# Patient Record
Sex: FEMALE | Race: BLACK OR AFRICAN AMERICAN | NOT HISPANIC OR LATINO | Employment: FULL TIME | URBAN - METROPOLITAN AREA
[De-identification: names, ages, dates, MRNs, and addresses within clinical notes are randomized per-mention and may not be internally consistent; named-entity substitution may affect disease eponyms.]

---

## 2023-03-28 LAB
ERYTHROCYTE DISTRIBUTION WIDTH (RATIO) BY AUTOMATED COUNT: 12.6 % (ref 11.5–14.5)
ERYTHROCYTE MEAN CORPUSCULAR HEMOGLOBIN CONCENTRATION (G/DL) BY AUTOMATED: 31 G/DL (ref 32–36)
ERYTHROCYTE MEAN CORPUSCULAR VOLUME (FL) BY AUTOMATED COUNT: 90 FL (ref 80–100)
ERYTHROCYTES (10*6/UL) IN BLOOD BY AUTOMATED COUNT: 3.7 X10E12/L (ref 4–5.2)
FERRITIN, PREGNANCY: 20 UG/L
FOLATE, SERUM, PREGNANCY: 22.2 NG/ML
GLUCOSE, 1 HR SCREEN, PREG: 114 MG/DL
HEMATOCRIT (%) IN BLOOD BY AUTOMATED COUNT: 33.2 % (ref 36–46)
HEMOGLOBIN (G/DL) IN BLOOD: 10.3 G/DL (ref 12–16)
IRON (UG/DL) IN SER/PLAS IN PREGNANCY: 52 UG/DL
IRON BINDING CAPACITY (UG/DL) IN PREGNANCY: 426 UG/DL
IRON SATURATION (%) IN PREGNANCY: 12 %
LEUKOCYTES (10*3/UL) IN BLOOD BY AUTOMATED COUNT: 11.4 X10E9/L (ref 4.4–11.3)
NRBC (PER 100 WBCS) BY AUTOMATED COUNT: 0 /100 WBC (ref 0–0)
PLATELETS (10*3/UL) IN BLOOD AUTOMATED COUNT: 304 X10E9/L (ref 150–450)
REFLEX ADDED, ANEMIA PANEL: ABNORMAL
SYPHILIS TOTAL AB: NONREACTIVE
VITAMIN B12, PREGNANCY: 511 PG/ML

## 2023-05-02 ENCOUNTER — APPOINTMENT (OUTPATIENT)
Dept: LAB | Facility: LAB | Age: 38
End: 2023-05-02

## 2023-05-02 LAB
ERYTHROCYTE DISTRIBUTION WIDTH (RATIO) BY AUTOMATED COUNT: 13 % (ref 11.5–14.5)
ERYTHROCYTE MEAN CORPUSCULAR HEMOGLOBIN CONCENTRATION (G/DL) BY AUTOMATED: 30.7 G/DL (ref 32–36)
ERYTHROCYTE MEAN CORPUSCULAR VOLUME (FL) BY AUTOMATED COUNT: 90 FL (ref 80–100)
ERYTHROCYTES (10*6/UL) IN BLOOD BY AUTOMATED COUNT: 3.85 X10E12/L (ref 4–5.2)
FERRITIN, PREGNANCY: 24 UG/L
FOLATE, SERUM, PREGNANCY: 16.7 NG/ML
HEMATOCRIT (%) IN BLOOD BY AUTOMATED COUNT: 34.5 % (ref 36–46)
HEMOGLOBIN (G/DL) IN BLOOD: 10.6 G/DL (ref 12–16)
HEMOGLOBIN (PG) IN RETICULOCYTES: 32 PG (ref 28–38)
IMMATURE RETIC FRACTION: 19.8 % (ref 0–16)
IRON (UG/DL) IN SER/PLAS IN PREGNANCY: 78 UG/DL
IRON BINDING CAPACITY (UG/DL) IN PREGNANCY: 405 UG/DL
IRON SATURATION (%) IN PREGNANCY: 19 %
LEUKOCYTES (10*3/UL) IN BLOOD BY AUTOMATED COUNT: 13.1 X10E9/L (ref 4.4–11.3)
NRBC (PER 100 WBCS) BY AUTOMATED COUNT: 0 /100 WBC (ref 0–0)
PLATELETS (10*3/UL) IN BLOOD AUTOMATED COUNT: 286 X10E9/L (ref 150–450)
REFLEX ADDED, ANEMIA PANEL: ABNORMAL
RETICULOCYTES (10*3/UL) IN BLOOD: 0.06 X10E12/L (ref 0.02–0.08)
RETICULOCYTES/100 ERYTHROCYTES IN BLOOD BY AUTOMATED COUNT: 1.6 % (ref 0.5–2)
VITAMIN B12, PREGNANCY: 421 PG/ML

## 2023-06-14 LAB — GROUP B STREP SCREEN: NORMAL

## 2023-07-22 ENCOUNTER — HOSPITAL ENCOUNTER (OUTPATIENT)
Dept: DATA CONVERSION | Facility: HOSPITAL | Age: 38
End: 2023-07-23
Attending: ADVANCED PRACTICE MIDWIFE

## 2023-07-22 DIAGNOSIS — R30.0 DYSURIA: ICD-10-CM

## 2023-07-23 LAB
APPEARANCE, URINE: NORMAL
ASCORBIC ACID: NORMAL MG/DL
BILIRUBIN, URINE: NORMAL
BLOOD, URINE: NORMAL
COLOR, URINE: NORMAL
GLUCOSE, URINE: NORMAL
KETONES, URINE: NORMAL
LEUKOCYTE ESTERASE, URINE: NORMAL
NITRITE, URINE: NORMAL
PH, URINE: NORMAL
PROTEIN, URINE: NORMAL
SPECIFIC GRAVITY, URINE: NORMAL
UROBILINOGEN, URINE: NORMAL

## 2023-08-09 LAB
ABO GROUP (TYPE) IN BLOOD: NORMAL
ANTIBODY SCREEN: NORMAL
ERYTHROCYTE DISTRIBUTION WIDTH (RATIO) BY AUTOMATED COUNT: 14.4 % (ref 11.5–14.5)
ERYTHROCYTE MEAN CORPUSCULAR HEMOGLOBIN CONCENTRATION (G/DL) BY AUTOMATED: 30.7 G/DL (ref 32–36)
ERYTHROCYTE MEAN CORPUSCULAR VOLUME (FL) BY AUTOMATED COUNT: 90 FL (ref 80–100)
ERYTHROCYTES (10*6/UL) IN BLOOD BY AUTOMATED COUNT: 4.75 X10E12/L (ref 4–5.2)
HEMATOCRIT (%) IN BLOOD BY AUTOMATED COUNT: 42.7 % (ref 36–46)
HEMOGLOBIN (G/DL) IN BLOOD: 13.1 G/DL (ref 12–16)
LEUKOCYTES (10*3/UL) IN BLOOD BY AUTOMATED COUNT: 5.4 X10E9/L (ref 4.4–11.3)
NRBC (PER 100 WBCS) BY AUTOMATED COUNT: 0 /100 WBC (ref 0–0)
PLATELETS (10*3/UL) IN BLOOD AUTOMATED COUNT: 340 X10E9/L (ref 150–450)
REFLEX ADDED, ANEMIA PANEL: ABNORMAL
RH FACTOR: NORMAL

## 2023-08-10 LAB — URINE CULTURE: NORMAL

## 2023-10-17 ENCOUNTER — ANCILLARY PROCEDURE (OUTPATIENT)
Dept: RADIOLOGY | Facility: CLINIC | Age: 38
End: 2023-10-17
Payer: COMMERCIAL

## 2023-10-17 DIAGNOSIS — M79.645 FINGER PAIN, LEFT: ICD-10-CM

## 2023-10-17 PROBLEM — E55.9 VITAMIN D DEFICIENCY: Status: ACTIVE | Noted: 2023-10-17

## 2023-10-17 PROBLEM — N94.6 DYSMENORRHEA: Status: ACTIVE | Noted: 2023-10-17

## 2023-10-17 PROBLEM — H52.03 HYPERMETROPIA OF BOTH EYES: Status: ACTIVE | Noted: 2023-10-17

## 2023-10-17 PROBLEM — N76.0 BACTERIAL VAGINOSIS: Status: ACTIVE | Noted: 2023-10-17

## 2023-10-17 PROBLEM — L30.9 ECZEMA: Status: ACTIVE | Noted: 2023-10-17

## 2023-10-17 PROBLEM — B96.89 BACTERIAL VAGINOSIS: Status: ACTIVE | Noted: 2023-10-17

## 2023-10-17 PROCEDURE — 73140 X-RAY EXAM OF FINGER(S): CPT | Mod: LEFT SIDE | Performed by: INTERNAL MEDICINE

## 2023-10-17 PROCEDURE — 73140 X-RAY EXAM OF FINGER(S): CPT | Mod: LT

## 2023-10-17 RX ORDER — ESTRADIOL 0.1 MG/G
2 CREAM VAGINAL NIGHTLY
COMMUNITY

## 2023-10-17 RX ORDER — METOCLOPRAMIDE 10 MG/1
1 TABLET ORAL EVERY 8 HOURS PRN
COMMUNITY
Start: 2023-06-23

## 2023-10-17 RX ORDER — ONDANSETRON 4 MG/1
1 TABLET, ORALLY DISINTEGRATING ORAL EVERY 6 HOURS PRN
COMMUNITY
Start: 2023-07-10 | End: 2023-11-28 | Stop reason: ALTCHOICE

## 2023-10-17 RX ORDER — DIPHENHYDRAMINE HCL 25 MG/1
1 TABLET ORAL EVERY 4 HOURS PRN
COMMUNITY
Start: 2023-02-03

## 2023-10-17 RX ORDER — METRONIDAZOLE 7.5 MG/G
1 GEL VAGINAL NIGHTLY
COMMUNITY
Start: 2022-05-03

## 2023-10-17 RX ORDER — HYDROCORTISONE BUTYRATE 1 MG/G
OINTMENT TOPICAL 2 TIMES DAILY PRN
COMMUNITY
Start: 2021-12-20

## 2023-10-17 RX ORDER — FAMOTIDINE 20 MG/1
1 TABLET, FILM COATED ORAL EVERY 12 HOURS
COMMUNITY
Start: 2023-06-20

## 2023-10-17 RX ORDER — FLUTICASONE PROPIONATE 50 MCG
1 SPRAY, SUSPENSION (ML) NASAL 2 TIMES DAILY
COMMUNITY
Start: 2016-01-28

## 2023-10-17 RX ORDER — VITAMIN E (DL,TOCOPHERYL ACET) 180 MG
CAPSULE ORAL
COMMUNITY

## 2023-10-17 RX ORDER — TRIAMCINOLONE ACETONIDE 1 MG/G
OINTMENT TOPICAL 2 TIMES DAILY PRN
COMMUNITY
Start: 2021-12-20

## 2023-10-17 NOTE — PROGRESS NOTES
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  Was at [employer] talking on cell phone.  And noticed finger pain and mass.  Can't recall hitting it or injuring it.  Did get back to working out. It is moderately tender- gets in the way of things.     Baby: Alexis doing well         Past MSK HX:  Specialty Problems    MSK PMH:  Had a baby 7/2023 - Alexis  7/2020 L shoulder impingement  [id number] , 2016, 2018- Left Severe patella maltracking and malalignment with a history of patellar subluxation and dislocation episodes (managed with brace and PT with Dr. Tre Wilkins)  10/2011: Right patellar tendonitis and PFS (managed by Dr. Tre Wilkins with HEP, refused PT)  3/2013: Left traumatic location with recommended formal PT and MRI if not improved (Managed by Dr. Tre Wilkins)       ROS  12 point ROS reviewed and is negative except for items listed   None     Social Hx:     Social:   Studying sports administration. Plays basketball recreationally.  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 wants to be an AD in  and then college.    Medications:   Current Outpatient Medications on File Prior to Visit   Medication Sig Dispense Refill    acetaminophen (Tylenol) 325 mg tablet TAKE 3 TABLETS BY MOUTH EVERY 6 HOURS AS NEEDED 180 tablet 0    ascorbic acid (CHEWABLE VITAMIN C ORAL) Take by mouth.      ascorbic acid/elderberry fruit (AIRBORNE, ELDERBERRY, ORAL) Take by mouth.      Banophen 25 mg tablet Take 1 tablet (25 mg) by mouth every 4 hours if needed.      cetirizine (ZyrTEC) 10 mg capsule Take 1 capsule (10 mg) by mouth once daily.      drospirenone, contraceptive, 4 mg (28) tablet TAKE 1 TABLET BY  MOUTH ONCE DAILY 28 tablet 2    estradiol (Estrace) 0.01 % (0.1 mg/gram) vaginal cream Insert 2 g into the vagina once daily at bedtime. FOR 2 WEEKS AND THEN 2-4 TIMES PER WEEK FOR MAINTENANCE      famotidine (Pepcid) 20 mg tablet Take 1 tablet (20 mg) by mouth every 12 hours.      fluticasone (Flonase) 50 mcg/actuation nasal spray Administer 1 spray into each nostril 2 times a day.      hydrocortisone butyrate 0.1 % ointment Apply topically 2 times a day as needed. 1 WEEK ON /1 WEEK OFF      ibuprofen 600 mg tablet TAKE 1 TABLET BY MOUTH EVERY 6 HOURS AS NEEDED FOR PAIN AS DIRECTED 60 tablet 0    metoclopramide (Reglan) 10 mg tablet Take 1 tablet (10 mg) by mouth every 8 hours if needed.      metroNIDAZOLE (Metrogel) 0.75 % (37.5mg/5 gram) vaginal gel Insert 1 Application into the vagina once daily at bedtime.      norethindrone (Micronor) 0.35 mg tablet TAKE 1 TABLET BY MOUTH ONCE DAILY 84 tablet 0    ondansetron ODT (Zofran-ODT) 4 mg disintegrating tablet Take 1 tablet (4 mg) by mouth every 6 hours if needed. OR 2 TABLETS EVERY 8 HOURS      triamcinolone (Kenalog) 0.1 % ointment Apply topically 2 times a day as needed. 1 WEEK ON/ 1 WEEK OFF      turmeric/turmeric ext/pepr ext (turmeric-turmeric ext-pepper) 500-3 mg capsule Take by mouth.       No current facility-administered medications on file prior to visit.         Allergies:    Allergies   Allergen Reactions    Other Unknown        Physical Exam:    There were no vitals taken for this visit.   General appearance: Well-appearing well-nourished  Psych: Normal mood and affect    Neuro: Normal sensation to light touch throughout the involved extremities  Vascular: No extremity edema or discoloration.  Skin: negative.  Lymphatic: no regional lymphadenopathy present.  Eyes: no conjunctival injection.    Hand exam  VI: no erythema of masss  ROM: FROM all PIP, DIP MCP joints  Firm masss radial aspect L 5th finger, mildly TTP  Strength 5/5 strength ,  interosseous, flex/ext all DIP, PIP, MCP joints        Imaging:  L 5th finger: no bony mass, soft tissue prominence distal phalanx  No images are attached to the encounter.   No images are attached to the encounter or orders placed in the encounter.       Imaging was personally interpreted and reviewed with the patient and/or family    Impression and Plan:  Alyssa Castellanos is a 38 y.o. female who is active who presented on 10/18/2023  with w L 5t finger distal phalanx mass. It is uncomfortable during ADLs    Objective: mass radial aspect L 5th finger distal phalanx ildly TTO  Imaging: focal St prominence radial aspect distal phalanx    Diagnosis: soft tissue mass L distal phalanx  Plan: consult hand           ** Please excuse any errors in grammar or translation related to this dictation. Voice recognition software was utilized to prepare this document. **

## 2023-10-18 ENCOUNTER — OFFICE VISIT (OUTPATIENT)
Dept: ORTHOPEDIC SURGERY | Facility: CLINIC | Age: 38
End: 2023-10-18
Payer: COMMERCIAL

## 2023-10-18 DIAGNOSIS — M79.645 FINGER PAIN, LEFT: Primary | ICD-10-CM

## 2023-10-18 PROCEDURE — 99213 OFFICE O/P EST LOW 20 MIN: CPT | Performed by: PEDIATRICS

## 2023-10-18 PROCEDURE — 1036F TOBACCO NON-USER: CPT | Performed by: PEDIATRICS

## 2023-10-23 ENCOUNTER — OFFICE VISIT (OUTPATIENT)
Dept: ORTHOPEDIC SURGERY | Facility: CLINIC | Age: 38
End: 2023-10-23
Payer: COMMERCIAL

## 2023-10-23 DIAGNOSIS — R22.32 MASS OF FINGER OF LEFT HAND: Primary | ICD-10-CM

## 2023-10-23 PROCEDURE — 99203 OFFICE O/P NEW LOW 30 MIN: CPT | Performed by: STUDENT IN AN ORGANIZED HEALTH CARE EDUCATION/TRAINING PROGRAM

## 2023-10-23 PROCEDURE — 1036F TOBACCO NON-USER: CPT | Performed by: STUDENT IN AN ORGANIZED HEALTH CARE EDUCATION/TRAINING PROGRAM

## 2023-10-23 ASSESSMENT — PAIN - FUNCTIONAL ASSESSMENT: PAIN_FUNCTIONAL_ASSESSMENT: 0-10

## 2023-10-23 ASSESSMENT — PAIN DESCRIPTION - DESCRIPTORS: DESCRIPTORS: ACHING

## 2023-10-23 ASSESSMENT — PAIN SCALES - GENERAL: PAINLEVEL_OUTOF10: 3

## 2023-10-24 ENCOUNTER — APPOINTMENT (OUTPATIENT)
Dept: OBSTETRICS AND GYNECOLOGY | Facility: HOSPITAL | Age: 38
End: 2023-10-24

## 2023-10-25 NOTE — PROGRESS NOTES
Hand and Upper Extremity Service  Initial evaluation / Consultation          Chief Complaint: Left small finger mass     Patient is a right-hand-dominant female presents with left small finger mass.  She does not recall any injury or trauma.  She noticed it when she was talking on the phone.  She states that it is moderately tender to palpation.  It bothers her when she strikes it.  It bothers her when she is exercising.  Denies any numbness or tingling.        Please refer to New Patient Intake Form scanned into patient's electronic record for self reported past medical history, past surgical history, medications, allergies, family history, social history and 10 point review of systems     Examination:  Constitutional: Oriented to person, place, and time.  Appears well-developed and well-nourished.  Head: Normocephalic and atraumatic.  Eyes: Pupils are equal, round, and reactive to light.  Cardiovascular: Intact distal pulses.   Pulmonary/Chest/Breast: Effort normal. No respiratory distress.  Neurological: Alert and oriented to person, place, and time.  Skin: Skin is warm and dry.  Psychiatric: normal mood and affect.  Behavior is normal.  Musculoskeletal: Left hand small finger  There is a radial sided finger mass over the distal phalanx.  This is semi-firm, nonfluctuant and not mobile.  Is tender to palpation.  Mass is approximately 4 mm x 5 mm.  There is a negative Tinel's over it.  Digital arteries are patent when Martin test to the fingers.       Personal Interpretation of Diagnostic studies: XR of left small finger  3 views of left finger PA, lateral, oblique demonstrate no fracture dislocations.         Impression: Left small finger soft tissue mass         In Office Procedures Performed: None         Medical Decision Making: We discussed   Operative versus nonoperative treatment for this.  I do not recommend trying to aspirate this as this is over the digital  neurovascular bundle and I do not want to injure the nerve or digital artery.  We discussed that she can closely preserve this at this increases in size or pain worsens then she can have this removed.  We discussed that this is most likely a benign tumor however it could be a malignant tumor but the chances are low.  There are no concerning factors at this time and this would be malignant and we can continue to observe the tumor for growth.    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  There is a small chance that this could be a nerve sheath tumor.         Medications Prescribed: None        Follow up: 3 months but she may cancel if symptoms or size of her tumor have not progressed.           Will Jesusucler,   [facility]  Department of Orthopaedic Surgery  Hand and Upper Extremity Reconstruction           Dictation performed with the use of voice recognition software.  Syntax and grammatical errors may exist.

## 2023-10-31 ENCOUNTER — APPOINTMENT (OUTPATIENT)
Dept: OBSTETRICS AND GYNECOLOGY | Facility: HOSPITAL | Age: 38
End: 2023-10-31
Payer: COMMERCIAL

## 2023-11-28 ENCOUNTER — OFFICE VISIT (OUTPATIENT)
Dept: OBSTETRICS AND GYNECOLOGY | Facility: HOSPITAL | Age: 38
End: 2023-11-28
Payer: COMMERCIAL

## 2023-11-28 VITALS
WEIGHT: 221 LBS | SYSTOLIC BLOOD PRESSURE: 105 MMHG | HEIGHT: 66 IN | DIASTOLIC BLOOD PRESSURE: 67 MMHG | BODY MASS INDEX: 35.52 KG/M2

## 2023-11-28 DIAGNOSIS — N89.8 VAGINAL IRRITATION: Primary | ICD-10-CM

## 2023-11-28 DIAGNOSIS — N76.0 ACUTE VAGINITIS: ICD-10-CM

## 2023-11-28 PROCEDURE — 87205 SMEAR GRAM STAIN: CPT | Performed by: STUDENT IN AN ORGANIZED HEALTH CARE EDUCATION/TRAINING PROGRAM

## 2023-11-28 PROCEDURE — 1036F TOBACCO NON-USER: CPT | Performed by: STUDENT IN AN ORGANIZED HEALTH CARE EDUCATION/TRAINING PROGRAM

## 2023-11-28 PROCEDURE — 87102 FUNGUS ISOLATION CULTURE: CPT | Performed by: STUDENT IN AN ORGANIZED HEALTH CARE EDUCATION/TRAINING PROGRAM

## 2023-11-28 PROCEDURE — 99214 OFFICE O/P EST MOD 30 MIN: CPT | Performed by: STUDENT IN AN ORGANIZED HEALTH CARE EDUCATION/TRAINING PROGRAM

## 2023-11-28 RX ORDER — FLUCONAZOLE 150 MG/1
150 TABLET ORAL
Qty: 3 TABLET | Refills: 0 | Status: SHIPPED | OUTPATIENT
Start: 2023-11-28 | End: 2023-12-05

## 2023-11-28 RX ORDER — CETIRIZINE HYDROCHLORIDE 5 MG/1
5 TABLET, CHEWABLE ORAL DAILY
COMMUNITY

## 2023-11-28 ASSESSMENT — PAIN SCALES - GENERAL: PAINLEVEL: 0-NO PAIN

## 2023-11-29 LAB
BACTERIAL VAGINOSIS VAG-IMP: NORMAL
CLUE CELLS VAG LPF-#/AREA: NORMAL /[LPF]
NUGENT SCORE: 4
YEAST VAG WET PREP-#/AREA: NORMAL

## 2023-12-01 LAB
FUNGUS SPEC CULT: ABNORMAL
FUNGUS SPEC FUNGUS STN: ABNORMAL

## 2023-12-04 NOTE — PROGRESS NOTES
Subjective   Patient ID: Alyssa Castellanos is a 38 y.o. female who presents for Follow-up (Vaginal laceration healing check/DEL 7/12/2023/Declined chaperone MARYSOL FARR).  Presents for follow up visit.  Vaginal pain is resolved and she feels much better but in the last week has developed vaginitis and thinks she may have a yeast infection.        Review of Systems   All other systems reviewed and are negative.      Objective   Physical Exam  Constitutional:       Appearance: Normal appearance.   HENT:      Head: Normocephalic and atraumatic.      Nose: Nose normal.      Mouth/Throat:      Mouth: Mucous membranes are moist.      Pharynx: No oropharyngeal exudate or posterior oropharyngeal erythema.   Eyes:      Extraocular Movements: Extraocular movements intact.      Conjunctiva/sclera: Conjunctivae normal.   Pulmonary:      Effort: Pulmonary effort is normal.   Genitourinary:     General: Normal vulva.      Comments: Thick white curd-like discharge present  Musculoskeletal:      Cervical back: Normal range of motion.   Skin:     Findings: No bruising, erythema, lesion or rash.   Neurological:      General: No focal deficit present.      Mental Status: She is alert.   Psychiatric:         Mood and Affect: Mood normal.         Behavior: Behavior normal.         Thought Content: Thought content normal.         Judgment: Judgment normal.         Assessment/Plan   Diagnoses and all orders for this visit:  Acute vaginitis  -     Vaginitis Gram Stain For Bacterial Vaginosis + Yeast  -     Fungal Culture/Smear  -     fluconazole (Diflucan) 150 mg tablet; Take 1 tablet (150 mg) by mouth every 3rd day for 3 doses. Repeat in 72 hours if symptoms persist.  If symptoms resolve after first dose, you may discard the second dose.    Follow up for well care or as needed    Chloe Martinez MD

## 2023-12-14 ENCOUNTER — CLINICAL SUPPORT (OUTPATIENT)
Dept: OBSTETRICS AND GYNECOLOGY | Facility: HOSPITAL | Age: 38
End: 2023-12-14
Payer: COMMERCIAL

## 2023-12-14 VITALS — DIASTOLIC BLOOD PRESSURE: 74 MMHG | BODY MASS INDEX: 35.83 KG/M2 | SYSTOLIC BLOOD PRESSURE: 114 MMHG | WEIGHT: 222 LBS

## 2023-12-14 DIAGNOSIS — N76.1 SUBACUTE VAGINITIS: ICD-10-CM

## 2023-12-14 DIAGNOSIS — N89.8 VAGINAL IRRITATION: Primary | ICD-10-CM

## 2023-12-14 DIAGNOSIS — N76.1 SUBACUTE VAGINITIS: Primary | ICD-10-CM

## 2023-12-14 PROCEDURE — 99211 OFF/OP EST MAY X REQ PHY/QHP: CPT | Performed by: ADVANCED PRACTICE MIDWIFE

## 2023-12-14 PROCEDURE — 87102 FUNGUS ISOLATION CULTURE: CPT

## 2023-12-14 ASSESSMENT — PAIN SCALES - GENERAL: PAINLEVEL: 0-NO PAIN

## 2023-12-20 LAB — YEAST SPEC QL CULT: ABNORMAL

## 2023-12-22 ENCOUNTER — OFFICE VISIT (OUTPATIENT)
Dept: ORTHOPEDIC SURGERY | Facility: CLINIC | Age: 38
End: 2023-12-22
Payer: COMMERCIAL

## 2023-12-22 DIAGNOSIS — R22.32 MASS OF FINGER OF LEFT HAND: Primary | ICD-10-CM

## 2023-12-22 PROCEDURE — 99213 OFFICE O/P EST LOW 20 MIN: CPT | Performed by: STUDENT IN AN ORGANIZED HEALTH CARE EDUCATION/TRAINING PROGRAM

## 2023-12-22 PROCEDURE — 1036F TOBACCO NON-USER: CPT | Performed by: STUDENT IN AN ORGANIZED HEALTH CARE EDUCATION/TRAINING PROGRAM

## 2023-12-22 RX ORDER — LIDOCAINE HYDROCHLORIDE 10 MG/ML
0.5 INJECTION INFILTRATION; PERINEURAL
Status: COMPLETED | OUTPATIENT
Start: 2023-12-22 | End: 2023-12-22

## 2023-12-22 RX ADMIN — LIDOCAINE HYDROCHLORIDE 0.5 ML: 10 INJECTION INFILTRATION; PERINEURAL at 23:22

## 2023-12-22 ASSESSMENT — PAIN SCALES - GENERAL: PAINLEVEL_OUTOF10: 0 - NO PAIN

## 2023-12-22 ASSESSMENT — PAIN - FUNCTIONAL ASSESSMENT: PAIN_FUNCTIONAL_ASSESSMENT: 0-10

## 2023-12-23 NOTE — PROGRESS NOTES
[facility]   Hand and Upper Extremity Service  Initial evaluation / Consultation          Chief Complaint: Left small finger mass     Patient is a right-hand-dominant female presents with left small finger mass.  She does not recall any injury or trauma.  She noticed it when she was talking on the phone.  She states that it is moderately tender to palpation.  It bothers her when she strikes it.  It bothers her when she is exercising.  Denies any numbness or tingling.    12/22/2023 follow-up  Patient presents for follow-up today.  She continues to have a left small finger mass over her distal phalanx of her small finger.  She continues to have pain with it.  She denies numbness or tingling.        Please refer to New Patient Intake Form scanned into patient's electronic record for self reported past medical history, past surgical history, medications, allergies, family history, social history and 10 point review of systems     Examination:  Constitutional: Oriented to person, place, and time.  Appears well-developed and well-nourished.  Head: Normocephalic and atraumatic.  Eyes: Pupils are equal, round, and reactive to light.  Cardiovascular: Intact distal pulses.   Pulmonary/Chest/Breast: Effort normal. No respiratory distress.  Neurological: Alert and oriented to person, place, and time.  Skin: Skin is warm and dry.  Psychiatric: normal mood and affect.  Behavior is normal.  Musculoskeletal: Left hand small finger  There is a radial sided finger mass over the distal phalanx.  This is semi-firm, nonfluctuant and not mobile.  Is tender to palpation.  Mass is approximately 6 mm x  6 mm.  There is a negative Tinel's over it.  Digital arteries are patent when Martin test to the fingers.       Personal Interpretation of Diagnostic studies: XR of left small finger  3 views of left finger PA, lateral, oblique demonstrate no fracture dislocations.         Impression: Left small finger soft  tissue mass         In Office Procedures Performed: Attempted aspiration of the left small finger mass.         Medical Decision Making: We discussed   Operative versus nonoperative treatment for this.  We discussed attempt of aspiration.  The mass is fairly superficial and easily be palpable.  The mass has grown in size since last visit.  The patient was agreeable.  The patient tolerated the procedure well however the mass was not able to be aspirated or decompressed.  We discussed obtaining an MRI or surgical excision.  The patient would like to continue to observe at this time.    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  There is a small chance that this could be a nerve sheath tumor.         Medications Prescribed: None        Follow up: 3 months but she may cancel if symptoms or size of her tumor have not progressed.    Hand / UE Inj/Asp for (Soft tissue mass over the volar radial side of the distal phalanx of the small finger.) on 12/22/2023 11:22 PM  Indications: diagnostic and pain  Details: 22 G needle, volar approach  Medications: 0.5 mL lidocaine 10 mg/mL (1 %)  Aspirate: 0 mL  Outcome: tolerated well, no immediate complications    There is no decompression of the mass.  I thought this was likely a ganglion cyst even though this is not his typical location.  However the mass was not able to be decompressed  Procedure, treatment alternatives, risks and benefits explained, specific risks discussed. Immediately prior to procedure a time out was called to verify the correct patient, procedure, equipment, support staff and site/side marked as required. Patient was prepped and draped in the usual sterile fashion.                 Jose Garcia DO  [facility]  Department of Orthopaedic Surgery  Hand and Upper Extremity Reconstruction           Dictation performed with the use of voice recognition software.  Syntax and grammatical errors may exist.

## 2023-12-26 DIAGNOSIS — B37.9 CANDIDA INFECTION: Primary | ICD-10-CM

## 2023-12-26 RX ORDER — IBREXAFUNGERP 150 MG/1
300 TABLET, FILM COATED ORAL EVERY 12 HOURS
Qty: 4 TABLET | Refills: 0 | Status: SHIPPED | OUTPATIENT
Start: 2023-12-26 | End: 2023-12-27

## 2023-12-27 ENCOUNTER — TELEPHONE (OUTPATIENT)
Dept: OBSTETRICS AND GYNECOLOGY | Facility: HOSPITAL | Age: 38
End: 2023-12-27
Payer: COMMERCIAL

## 2023-12-27 NOTE — TELEPHONE ENCOUNTER
Contacted patient regarding yeast infection medication (Brexafemme)  Treatment sent in due to patient's rare azole resistant infection  Insurance initially denied medication when put in for 4 day supply, but will cover for 30 days  Contacted pharmacy and had days supply updated to 30  Medication now approved with a copay of $85  Patient agreeable to price and will proceed with having medication filled  Pharmacy aware to get prescription ready for patient   Encouraged patient to call office with any questions or concerns  Anahy Wakefield RN

## 2023-12-29 ENCOUNTER — TELEPHONE (OUTPATIENT)
Dept: OBSTETRICS AND GYNECOLOGY | Facility: HOSPITAL | Age: 38
End: 2023-12-29
Payer: COMMERCIAL

## 2023-12-29 NOTE — TELEPHONE ENCOUNTER
Called [facility] today to make sure patient's rx was coming in order today  Pharmacy states the medication is on back order  Called [facility] pharmacy, also states medication is on back order  Patient notified and called [facility] and states the same thing  Will contact provider  Patient states symptoms are controlled at this time and has been using otc medications for symptom management  Will see if provider recommends anything else at this time  Anahy Wakefield RN

## 2024-01-11 ENCOUNTER — TELEPHONE (OUTPATIENT)
Dept: OBSTETRICS AND GYNECOLOGY | Facility: HOSPITAL | Age: 39
End: 2024-01-11
Payer: COMMERCIAL

## 2024-01-11 DIAGNOSIS — B37.31 VAGINAL YEAST INFECTION: Primary | ICD-10-CM

## 2024-01-23 ENCOUNTER — TELEPHONE (OUTPATIENT)
Dept: OBSTETRICS AND GYNECOLOGY | Facility: HOSPITAL | Age: 39
End: 2024-01-23
Payer: COMMERCIAL

## 2024-01-23 DIAGNOSIS — B37.31 VAGINAL YEAST INFECTION: Primary | ICD-10-CM

## 2024-01-23 NOTE — TELEPHONE ENCOUNTER
Spoke with patient regarding medication unable to be ordered for patient's azole resistant yeast infection  Brexafemme on back order with no date mentioned of when it will be able to be ordered  Alternate treatment of 1-600mg Boric Acid suppositories to insert vaginally everyday for 2 weeks recommended by Dr. Martinez  Called patient and discussed that medication is available for purchase over the counter and to make sure whatever brand she chooses to make sure it is 600mg of Boric Acid  Stressed importance that this medication is to be used only as a vaginal suppository only and can be fatal if used orally so to be sure to keep out of reach of children  Patient verbalized understanding  Encouraged patient to call office with any questions or concerns or if treatment does not help symptoms  Anahy Wakefield RN

## 2024-07-30 ENCOUNTER — APPOINTMENT (OUTPATIENT)
Dept: PRIMARY CARE | Facility: CLINIC | Age: 39
End: 2024-07-30
Payer: COMMERCIAL

## 2024-07-30 VITALS
OXYGEN SATURATION: 99 % | TEMPERATURE: 97 F | HEART RATE: 77 BPM | RESPIRATION RATE: 16 BRPM | SYSTOLIC BLOOD PRESSURE: 98 MMHG | BODY MASS INDEX: 34.79 KG/M2 | WEIGHT: 216.49 LBS | HEIGHT: 66 IN | DIASTOLIC BLOOD PRESSURE: 70 MMHG

## 2024-07-30 DIAGNOSIS — R63.5 WEIGHT GAIN: ICD-10-CM

## 2024-07-30 PROCEDURE — 3008F BODY MASS INDEX DOCD: CPT | Performed by: INTERNAL MEDICINE

## 2024-07-30 PROCEDURE — 99213 OFFICE O/P EST LOW 20 MIN: CPT | Performed by: INTERNAL MEDICINE

## 2024-07-30 ASSESSMENT — ENCOUNTER SYMPTOMS
CONSTIPATION: 0
ABDOMINAL PAIN: 0

## 2024-07-30 NOTE — PROGRESS NOTES
"Subjective   Patient ID: Alyssa Castellanos is a 39 y.o. female who presents for Weight Loss.    HPI She delivered her first son July 12 2023, she was able to lose some weight on pastpartum, then her life style was less active, re gain weight. She re start exercise in March 3 to 5 x week, since may she started using ozempic and in 2 months she has been able to loose 14 lb. She needs help since she is paying out of pocket.   Review recent labs:  pregnancy 1 hour glucose test, prior to pregnancy hemoglobin A1c and fasting glucose : all wnl.      Review of Systems   Gastrointestinal:  Negative for abdominal pain and constipation.        Early saciety   All other systems reviewed and are negative.      Objective   BP 98/70 (BP Location: Left arm, Patient Position: Sitting, BP Cuff Size: Child)   Pulse 77   Temp 36.1 °C (97 °F)   Resp 16   Ht 1.676 m (5' 6\")   Wt 98.2 kg (216 lb 7.9 oz)   SpO2 99%   BMI 34.94 kg/m²     Physical Exam  Eyes- Conjunctiva clear  Neck-no thyromegaly  Cardiac- regular rate and rhythm, no murmurs  Lung- clear to auscultation  GI- present  bowel sounds, nontender, no rebound  MSK- no deformities  Extremities- no edema, good distal pulses  Neuro- non focal, oriented x 3  Psychiatric- pleasant, well groomed, no hallucinations    Assessment/Plan   Problem List Items Addressed This Visit             ICD-10-CM    BMI 34.0-34.9,adult - Primary Z68.34     Pt will try next month if her insurance covers tirzapide. She will return to us for physical in 2 months.         Relevant Medications    tirzepatide, weight loss, (Zepbound) 2.5 mg/0.5 mL injection    Weight gain R63.5          "

## 2024-07-30 NOTE — ASSESSMENT & PLAN NOTE
Pt will try next month if her insurance covers tirzapide. She will return to us for physical in 2 months.

## 2024-07-30 NOTE — PATIENT INSTRUCTIONS
Continue exercise, maintain low carb diet, try new medication also weekly when done with current, contact us sooner than your physical as needed.

## 2024-07-31 PROBLEM — R63.5 WEIGHT GAIN: Status: ACTIVE | Noted: 2024-07-31

## 2024-10-17 ENCOUNTER — APPOINTMENT (OUTPATIENT)
Dept: PRIMARY CARE | Facility: CLINIC | Age: 39
End: 2024-10-17
Payer: COMMERCIAL

## 2024-10-17 ENCOUNTER — LAB (OUTPATIENT)
Dept: LAB | Facility: LAB | Age: 39
End: 2024-10-17
Payer: COMMERCIAL

## 2024-10-17 VITALS
RESPIRATION RATE: 16 BRPM | DIASTOLIC BLOOD PRESSURE: 70 MMHG | OXYGEN SATURATION: 99 % | BODY MASS INDEX: 32.45 KG/M2 | HEIGHT: 66 IN | SYSTOLIC BLOOD PRESSURE: 108 MMHG | WEIGHT: 201.94 LBS | HEART RATE: 76 BPM

## 2024-10-17 DIAGNOSIS — Z00.00 PHYSICAL EXAM, ANNUAL: ICD-10-CM

## 2024-10-17 DIAGNOSIS — Z00.00 PHYSICAL EXAM, ANNUAL: Primary | ICD-10-CM

## 2024-10-17 LAB
25(OH)D3 SERPL-MCNC: 18 NG/ML (ref 30–100)
ALBUMIN SERPL BCP-MCNC: 4.5 G/DL (ref 3.4–5)
ALP SERPL-CCNC: 59 U/L (ref 33–110)
ALT SERPL W P-5'-P-CCNC: 14 U/L (ref 7–45)
ANION GAP SERPL CALC-SCNC: 13 MMOL/L (ref 10–20)
AST SERPL W P-5'-P-CCNC: 22 U/L (ref 9–39)
BILIRUB SERPL-MCNC: 0.7 MG/DL (ref 0–1.2)
BUN SERPL-MCNC: 12 MG/DL (ref 6–23)
CALCIUM SERPL-MCNC: 9.6 MG/DL (ref 8.6–10.6)
CHLORIDE SERPL-SCNC: 102 MMOL/L (ref 98–107)
CHOLEST SERPL-MCNC: 212 MG/DL (ref 0–199)
CHOLESTEROL/HDL RATIO: 4.6
CO2 SERPL-SCNC: 26 MMOL/L (ref 21–32)
CREAT SERPL-MCNC: 0.82 MG/DL (ref 0.5–1.05)
EGFRCR SERPLBLD CKD-EPI 2021: >90 ML/MIN/1.73M*2
ERYTHROCYTE [DISTWIDTH] IN BLOOD BY AUTOMATED COUNT: 13 % (ref 11.5–14.5)
EST. AVERAGE GLUCOSE BLD GHB EST-MCNC: 100 MG/DL
GLUCOSE SERPL-MCNC: 90 MG/DL (ref 74–99)
HBA1C MFR BLD: 5.1 %
HCT VFR BLD AUTO: 41.4 % (ref 36–46)
HDLC SERPL-MCNC: 46.5 MG/DL
HGB BLD-MCNC: 12.6 G/DL (ref 12–16)
LDLC SERPL CALC-MCNC: 154 MG/DL
MCH RBC QN AUTO: 27.5 PG (ref 26–34)
MCHC RBC AUTO-ENTMCNC: 30.4 G/DL (ref 32–36)
MCV RBC AUTO: 90 FL (ref 80–100)
MEV IGG SER QL IA: POSITIVE
MUMPS IGG ANTIBODY INDEX: 2.8 IA
MUV IGG SER IA-ACNC: POSITIVE
NON HDL CHOLESTEROL: 166 MG/DL (ref 0–149)
NRBC BLD-RTO: 0 /100 WBCS (ref 0–0)
PLATELET # BLD AUTO: 337 X10*3/UL (ref 150–450)
POTASSIUM SERPL-SCNC: 4 MMOL/L (ref 3.5–5.3)
PROT SERPL-MCNC: 7.3 G/DL (ref 6.4–8.2)
RBC # BLD AUTO: 4.58 X10*6/UL (ref 4–5.2)
RUBEOLA IGG ANTIBODY INDEX: >8 IA
RUBV IGG SERPL IA-ACNC: 3.1 IA
RUBV IGG SERPL QL IA: POSITIVE
SODIUM SERPL-SCNC: 137 MMOL/L (ref 136–145)
TRIGL SERPL-MCNC: 59 MG/DL (ref 0–149)
TSH SERPL-ACNC: 1.75 MIU/L (ref 0.44–3.98)
VLDL: 12 MG/DL (ref 0–40)
WBC # BLD AUTO: 6.5 X10*3/UL (ref 4.4–11.3)

## 2024-10-17 PROCEDURE — 82306 VITAMIN D 25 HYDROXY: CPT

## 2024-10-17 PROCEDURE — 86765 RUBEOLA ANTIBODY: CPT

## 2024-10-17 PROCEDURE — 80053 COMPREHEN METABOLIC PANEL: CPT

## 2024-10-17 PROCEDURE — 3008F BODY MASS INDEX DOCD: CPT | Performed by: INTERNAL MEDICINE

## 2024-10-17 PROCEDURE — 80061 LIPID PANEL: CPT

## 2024-10-17 PROCEDURE — 83036 HEMOGLOBIN GLYCOSYLATED A1C: CPT

## 2024-10-17 PROCEDURE — 84443 ASSAY THYROID STIM HORMONE: CPT

## 2024-10-17 PROCEDURE — 85027 COMPLETE CBC AUTOMATED: CPT

## 2024-10-17 PROCEDURE — 36415 COLL VENOUS BLD VENIPUNCTURE: CPT

## 2024-10-17 PROCEDURE — 99395 PREV VISIT EST AGE 18-39: CPT | Performed by: INTERNAL MEDICINE

## 2024-10-17 PROCEDURE — 1036F TOBACCO NON-USER: CPT | Performed by: INTERNAL MEDICINE

## 2024-10-17 PROCEDURE — 86735 MUMPS ANTIBODY: CPT

## 2024-10-17 PROCEDURE — 86317 IMMUNOASSAY INFECTIOUS AGENT: CPT

## 2024-10-17 RX ORDER — EPINEPHRINE 0.3 MG/.3ML
INJECTION SUBCUTANEOUS
COMMUNITY
Start: 2024-03-26

## 2024-10-17 ASSESSMENT — ENCOUNTER SYMPTOMS
TREMORS: 0
BLOOD IN STOOL: 0
DEPRESSION: 0
AGITATION: 0
LIGHT-HEADEDNESS: 0
OCCASIONAL FEELINGS OF UNSTEADINESS: 0
NERVOUS/ANXIOUS: 0
ABDOMINAL DISTENTION: 0
BACK PAIN: 0
ABDOMINAL PAIN: 0
SLEEP DISTURBANCE: 0
PALPITATIONS: 0
LOSS OF SENSATION IN FEET: 0
CONSTIPATION: 0
NECK PAIN: 0
HEADACHES: 0
SHORTNESS OF BREATH: 0

## 2024-10-17 ASSESSMENT — PATIENT HEALTH QUESTIONNAIRE - PHQ9
2. FEELING DOWN, DEPRESSED OR HOPELESS: NOT AT ALL
SUM OF ALL RESPONSES TO PHQ9 QUESTIONS 1 AND 2: 0
1. LITTLE INTEREST OR PLEASURE IN DOING THINGS: NOT AT ALL

## 2024-10-17 ASSESSMENT — COLUMBIA-SUICIDE SEVERITY RATING SCALE - C-SSRS
6. HAVE YOU EVER DONE ANYTHING, STARTED TO DO ANYTHING, OR PREPARED TO DO ANYTHING TO END YOUR LIFE?: NO
1. IN THE PAST MONTH, HAVE YOU WISHED YOU WERE DEAD OR WISHED YOU COULD GO TO SLEEP AND NOT WAKE UP?: NO
2. HAVE YOU ACTUALLY HAD ANY THOUGHTS OF KILLING YOURSELF?: NO

## 2024-10-17 NOTE — PATIENT INSTRUCTIONS
Continue healthy life style, see soon gynecologist, today you will have blood work. Return sooner than 1 year.

## 2024-10-17 NOTE — PROGRESS NOTES
"Subjective   Patient ID: Alyssa Castellanos is a 39 y.o. female who presents for Annual Exam.    HPI She is 15 months postpartum, she stopped breastfeeding 7 months ago, she had laceration during delivery that heal well.    Her diet is currently 2 to 3 meals, or protein shakes. She  consumes poultry mostly. Average fresh produce about 2 to 3/day. Rare sweets.   Her insurance did not cover GLP1 and she found private clinic where she is paying affordable amount, since June, she was switched from ozempic to monjauro 1 m ago. Good tolerance , no constipation,no nausea and observe early saciety.  She has lost almost 30 lb.  She is currently doing 5 x week exercise cardio and weights . She is also basketball coaching daily.  Her cycles are irregular , she believes is related to med switch. She is interested in IUD.  Feels tired doing two jobs and raising her child.  Review of Systems   Respiratory:  Negative for shortness of breath.    Cardiovascular:  Negative for chest pain and palpitations.   Gastrointestinal:  Negative for abdominal distention, abdominal pain, blood in stool and constipation.   Musculoskeletal:  Negative for back pain and neck pain.   Neurological:  Negative for tremors, light-headedness and headaches.   Psychiatric/Behavioral:  Negative for agitation and sleep disturbance. The patient is not nervous/anxious.    All other systems reviewed and are negative.      Objective   /70 (BP Location: Left arm, Patient Position: Sitting, BP Cuff Size: Adult)   Pulse 76   Resp 16   Ht 1.676 m (5' 6\")   Wt 91.6 kg (201 lb 15.1 oz)   SpO2 99%   BMI 32.59 kg/m²     Physical Exam  15 lb down since july  Eyes - conjunctiva clear, PERRLA  HEENT - no impacted wax  Neck - No cervical lymphadenopathy, no thyromegaly  Axilla - no palpable lymphadenopathy  Cardiac - regular rate and rhythm, no murmurs, no carotid bruit, no JVP  Lung- clear to auscultation, no rales, no rhonchi, no wheezing  GI- normally active bowel " sounds, nontender, nondistended, no hepatosplenomegaly, no rebound  MSK - no deformities  Neuro - non focal, oriented x 3  Extremities - no edema, good distal arterial pulses  Skin - no rash  Psychiatric - pleasant, cooperative, no hallucinations    Assessment/Plan   Problem List Items Addressed This Visit             ICD-10-CM    Physical exam, annual - Primary Z00.00     Discussed diet, exercise, immunizations, and screening appropriate for their age.  Colonoscopy: never  PAP: April 2022 normal           Relevant Orders    CBC    Hemoglobin A1C    Comprehensive Metabolic Panel    Lipid Panel    TSH with reflex to Free T4 if abnormal    Vitamin D 25-Hydroxy,Total (for eval of Vitamin D levels)    Rubella Antibody, IgG    Rubeola Antibody, IgG    Mumps Antibody, IgG    Referral to Ophthalmology

## 2024-10-17 NOTE — ASSESSMENT & PLAN NOTE
Discussed diet, exercise,, and screening appropriate for their age. Declines inmunizations.  Colonoscopy: never  PAP: April 2022 normal

## 2024-10-18 ENCOUNTER — HOSPITAL ENCOUNTER (OUTPATIENT)
Dept: RADIOLOGY | Facility: CLINIC | Age: 39
Discharge: HOME | End: 2024-10-18
Payer: COMMERCIAL

## 2024-10-18 ENCOUNTER — APPOINTMENT (OUTPATIENT)
Dept: ORTHOPEDIC SURGERY | Facility: CLINIC | Age: 39
End: 2024-10-18
Payer: COMMERCIAL

## 2024-10-18 DIAGNOSIS — R22.32 MASS OF FINGER OF LEFT HAND: ICD-10-CM

## 2024-10-18 DIAGNOSIS — R22.32 MASS OF FINGER OF LEFT HAND: Primary | ICD-10-CM

## 2024-10-18 PROCEDURE — 73140 X-RAY EXAM OF FINGER(S): CPT | Mod: LT

## 2024-10-18 PROCEDURE — 99213 OFFICE O/P EST LOW 20 MIN: CPT | Performed by: STUDENT IN AN ORGANIZED HEALTH CARE EDUCATION/TRAINING PROGRAM

## 2024-10-18 ASSESSMENT — PAIN - FUNCTIONAL ASSESSMENT: PAIN_FUNCTIONAL_ASSESSMENT: NO/DENIES PAIN

## 2024-10-19 NOTE — H&P (VIEW-ONLY)
[facility]   Hand and Upper Extremity Service  Initial evaluation / Consultation          Chief Complaint: Left small finger mass     Patient is a right-hand-dominant female presents with left small finger mass.  She does not recall any injury or trauma.  She noticed it when she was talking on the phone.  She states that it is moderately tender to palpation.  It bothers her when she strikes it.  It bothers her when she is exercising.  Denies any numbness or tingling.    12/22/2023 follow-up  Patient presents for follow-up today.  She continues to have a left small finger mass over her distal phalanx of her small finger.  She continues to have pain with it.  She denies numbness or tingling.    8/18/2024 follow-up:  Patient presents for follow-up today.  She notes that her left small finger mass over her distal phalanx has increased significantly in size.  She notes that it is tender when it is bumped.  She denies numbness and tingling.  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        Please refer to New Patient Intake Form scanned into patient's electronic record for self reported past medical history, past surgical history, medications, allergies, family history, social history and 10 point review of systems     Examination:  Constitutional: Oriented to person, place, and time.  Appears well-developed and well-nourished.  Head: Normocephalic and atraumatic.  Eyes: Pupils are equal, round, and reactive to light.  Cardiovascular: Intact distal pulses.   Pulmonary/Chest/Breast: Effort normal. No respiratory distress.  Neurological: Alert and oriented to person, place, and time.  Skin: Skin is warm and dry.  Psychiatric: normal mood and affect.  Behavior is normal.  Musculoskeletal: Left hand small finger  There is a large mass over the volar surface of the distal phalanx.  This appears larger on  the radial side however consistent of nearly the entire volar surface..  This is semi-firm, nonfluctuant and not mobile.  Is tender to palpation.  Mass is over a 1 cm x 1 cm.  There is a negative Tinel's over it.  Digital arteries are patent when Martin test to the fingers.       Personal Interpretation of Diagnostic studies: Repeat XR of left small finger were obtained today in office  3 views of left finger PA, lateral, oblique demonstrate no fracture dislocations.   no erosive changes.         Impression: Left small finger soft tissue mass         In Office Procedures Performed: None      Medical Decision Making: We discussed   This mass has grown considerably since her last visit.  It is symptomatic and painful when she bumps it.  At 1 point an attempted aspiration was performed as it was much smaller however this is much larger and it is more firm and does not appear to be consistent with a ganglion cyst.  This most likely is consistent with a giant cell tumor however it could be several different types of tumors.  She denies any previous lacerations or wounds but it could be an epidermal inclusion cyst, a nerve sheath tumor, malignant tumor, etc.  At this point based on the symptoms and the continued expansion of the tumor we will perform excision of of her tumor and we will send it off to pathology.           Medications Prescribed: None        Follow up: Postop    Alyssa Castellanos  [id number]  For Surgical Planning:  Diagnosis: Left small finger soft tissue mass  Procedure: Excision left small finger soft tissue mass  CPT: 35656  Anesthesia: MAC  Duration: 60 minutes  Special Equipment Needed: none  Medical Notes / PM / DM / PAT needed?: no  Location: anywhere  Initial Post Operative Visit: 2 weeks             Will Beucler,   [facility]  Department of Orthopaedic Surgery  Hand and Upper Extremity Reconstruction           Dictation performed with the use of voice recognition  software.  Syntax and grammatical errors may exist.

## 2024-10-19 NOTE — PROGRESS NOTES
[facility]   Hand and Upper Extremity Service  Initial evaluation / Consultation          Chief Complaint: Left small finger mass     Patient is a right-hand-dominant female presents with left small finger mass.  She does not recall any injury or trauma.  She noticed it when she was talking on the phone.  She states that it is moderately tender to palpation.  It bothers her when she strikes it.  It bothers her when she is exercising.  Denies any numbness or tingling.    12/22/2023 follow-up  Patient presents for follow-up today.  She continues to have a left small finger mass over her distal phalanx of her small finger.  She continues to have pain with it.  She denies numbness or tingling.    8/18/2024 follow-up:  Patient presents for follow-up today.  She notes that her left small finger mass over her distal phalanx has increased significantly in size.  She notes that it is tender when it is bumped.  She denies numbness and tingling.  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        Please refer to New Patient Intake Form scanned into patient's electronic record for self reported past medical history, past surgical history, medications, allergies, family history, social history and 10 point review of systems     Examination:  Constitutional: Oriented to person, place, and time.  Appears well-developed and well-nourished.  Head: Normocephalic and atraumatic.  Eyes: Pupils are equal, round, and reactive to light.  Cardiovascular: Intact distal pulses.   Pulmonary/Chest/Breast: Effort normal. No respiratory distress.  Neurological: Alert and oriented to person, place, and time.  Skin: Skin is warm and dry.  Psychiatric: normal mood and affect.  Behavior is normal.  Musculoskeletal: Left hand small finger  There is a large mass over the volar surface of the distal phalanx.  This appears larger on  the radial side however consistent of nearly the entire volar surface..  This is semi-firm, nonfluctuant and not mobile.  Is tender to palpation.  Mass is over a 1 cm x 1 cm.  There is a negative Tinel's over it.  Digital arteries are patent when Martin test to the fingers.       Personal Interpretation of Diagnostic studies: Repeat XR of left small finger were obtained today in office  3 views of left finger PA, lateral, oblique demonstrate no fracture dislocations.   no erosive changes.         Impression: Left small finger soft tissue mass         In Office Procedures Performed: None      Medical Decision Making: We discussed   This mass has grown considerably since her last visit.  It is symptomatic and painful when she bumps it.  At 1 point an attempted aspiration was performed as it was much smaller however this is much larger and it is more firm and does not appear to be consistent with a ganglion cyst.  This most likely is consistent with a giant cell tumor however it could be several different types of tumors.  She denies any previous lacerations or wounds but it could be an epidermal inclusion cyst, a nerve sheath tumor, malignant tumor, etc.  At this point based on the symptoms and the continued expansion of the tumor we will perform excision of of her tumor and we will send it off to pathology.           Medications Prescribed: None        Follow up: Postop    Alyssa Castellanos  [id number]  For Surgical Planning:  Diagnosis: Left small finger soft tissue mass  Procedure: Excision left small finger soft tissue mass  CPT: 23628  Anesthesia: MAC  Duration: 60 minutes  Special Equipment Needed: none  Medical Notes / PM / DM / PAT needed?: no  Location: anywhere  Initial Post Operative Visit: 2 weeks             Will Beucler,   [facility]  Department of Orthopaedic Surgery  Hand and Upper Extremity Reconstruction           Dictation performed with the use of voice recognition  software.  Syntax and grammatical errors may exist.

## 2024-10-21 DIAGNOSIS — R22.32 MASS OF FINGER OF LEFT HAND: ICD-10-CM

## 2024-10-22 NOTE — PROGRESS NOTES
"   Alyssa Castellanos is a 39 y.o. here for contraception counseling    HPI: Her baby is one year old and she is sure she is ready for an IUD.  She stopped taking pills and uses condoms inconsistently.    Last intercourse was Oct 20 and time prior to that was Sept 29.  Last period 9/9- she has irregular periods.  She has had irregular period depending on her weight.     GynHx: ?PCOS?      Social History     Substance and Sexual Activity   Sexual Activity Yes    Partners: Male     Objective   /72 (BP Location: Right arm)   Ht 1.676 m (5' 6\")   Wt 91.7 kg (202 lb 3.2 oz)   LMP 09/09/2024   BMI 32.64 kg/m²      General:   Alert and oriented, in no acute distress   Neck:    Breast/Axilla:    Abdomen:    Vulva:    Vagina:    Cervix:    Uterus:    Adnexa:    Pelvic Floor    Psych Normal affect. Normal mood.      Assessment and Plan:  Contraception counseling  Will give reshma for EC today  Will place IUD in two weeks- consent signed, side effects of bleeding discussed, she will take ibuprofen prior to that visit and will consider if she wants a block    Problem List Items Addressed This Visit    None  Visit Diagnoses       Encounter for IUD insertion    -  Primary    Encounter for emergency contraception        Relevant Medications    ulipristal (Reshma) 30 mg tablet           No orders of the defined types were placed in this encounter.       "

## 2024-10-24 ENCOUNTER — APPOINTMENT (OUTPATIENT)
Dept: OBSTETRICS AND GYNECOLOGY | Facility: CLINIC | Age: 39
End: 2024-10-24
Payer: COMMERCIAL

## 2024-10-24 ENCOUNTER — PHARMACY VISIT (OUTPATIENT)
Dept: PHARMACY | Facility: CLINIC | Age: 39
End: 2024-10-24
Payer: MEDICARE

## 2024-10-24 VITALS
SYSTOLIC BLOOD PRESSURE: 122 MMHG | BODY MASS INDEX: 32.5 KG/M2 | DIASTOLIC BLOOD PRESSURE: 72 MMHG | WEIGHT: 202.2 LBS | HEIGHT: 66 IN

## 2024-10-24 DIAGNOSIS — Z30.430 ENCOUNTER FOR IUD INSERTION: Primary | ICD-10-CM

## 2024-10-24 DIAGNOSIS — Z30.012 ENCOUNTER FOR EMERGENCY CONTRACEPTION: ICD-10-CM

## 2024-10-24 DIAGNOSIS — Z30.09 ENCOUNTER FOR OTHER GENERAL COUNSELING OR ADVICE ON CONTRACEPTION: ICD-10-CM

## 2024-10-24 PROCEDURE — RXMED WILLOW AMBULATORY MEDICATION CHARGE

## 2024-10-24 PROCEDURE — 3008F BODY MASS INDEX DOCD: CPT | Performed by: OBSTETRICS & GYNECOLOGY

## 2024-10-24 PROCEDURE — RXOTC WILLOW AMBULATORY OTC CHARGE

## 2024-10-24 PROCEDURE — 99213 OFFICE O/P EST LOW 20 MIN: CPT | Performed by: OBSTETRICS & GYNECOLOGY

## 2024-10-24 PROCEDURE — 1036F TOBACCO NON-USER: CPT | Performed by: OBSTETRICS & GYNECOLOGY

## 2024-10-24 RX ORDER — AZELASTINE 1 MG/ML
1 SPRAY, METERED NASAL
COMMUNITY
Start: 2024-03-26

## 2024-10-24 RX ORDER — MONTELUKAST SODIUM 10 MG/1
1 TABLET ORAL
COMMUNITY
Start: 2024-10-06

## 2024-10-24 ASSESSMENT — ENCOUNTER SYMPTOMS
HEMATOLOGIC/LYMPHATIC NEGATIVE: 0
EYES NEGATIVE: 0
NEUROLOGICAL NEGATIVE: 0
RESPIRATORY NEGATIVE: 0
ALLERGIC/IMMUNOLOGIC NEGATIVE: 0
ENDOCRINE NEGATIVE: 0
CARDIOVASCULAR NEGATIVE: 0
CONSTITUTIONAL NEGATIVE: 0
PSYCHIATRIC NEGATIVE: 0
GASTROINTESTINAL NEGATIVE: 0
MUSCULOSKELETAL NEGATIVE: 0

## 2024-10-24 ASSESSMENT — PAIN SCALES - GENERAL: PAINLEVEL_OUTOF10: 0-NO PAIN

## 2024-10-29 ENCOUNTER — TELEPHONE (OUTPATIENT)
Dept: PRIMARY CARE | Facility: CLINIC | Age: 39
End: 2024-10-29
Payer: COMMERCIAL

## 2024-10-29 RX ORDER — FLUTICASONE PROPIONATE 50 MCG
1 SPRAY, SUSPENSION (ML) NASAL DAILY PRN
COMMUNITY

## 2024-11-05 ENCOUNTER — ANESTHESIA EVENT (OUTPATIENT)
Dept: OPERATING ROOM | Facility: CLINIC | Age: 39
End: 2024-11-05
Payer: COMMERCIAL

## 2024-11-05 RX ORDER — ACETAMINOPHEN 325 MG/1
650 TABLET ORAL EVERY 4 HOURS PRN
OUTPATIENT
Start: 2024-11-05

## 2024-11-05 RX ORDER — LIDOCAINE HYDROCHLORIDE 10 MG/ML
0.1 INJECTION, SOLUTION EPIDURAL; INFILTRATION; INTRACAUDAL; PERINEURAL ONCE
OUTPATIENT
Start: 2024-11-05 | End: 2024-11-05

## 2024-11-05 RX ORDER — LABETALOL HYDROCHLORIDE 5 MG/ML
5 INJECTION, SOLUTION INTRAVENOUS ONCE AS NEEDED
OUTPATIENT
Start: 2024-11-05

## 2024-11-05 RX ORDER — METOCLOPRAMIDE HYDROCHLORIDE 5 MG/ML
10 INJECTION INTRAMUSCULAR; INTRAVENOUS ONCE AS NEEDED
OUTPATIENT
Start: 2024-11-05

## 2024-11-05 RX ORDER — ONDANSETRON HYDROCHLORIDE 2 MG/ML
4 INJECTION, SOLUTION INTRAVENOUS ONCE AS NEEDED
OUTPATIENT
Start: 2024-11-05

## 2024-11-05 RX ORDER — ALBUTEROL SULFATE 0.83 MG/ML
2.5 SOLUTION RESPIRATORY (INHALATION) ONCE AS NEEDED
OUTPATIENT
Start: 2024-11-05

## 2024-11-05 RX ORDER — FENTANYL CITRATE 50 UG/ML
25 INJECTION, SOLUTION INTRAMUSCULAR; INTRAVENOUS EVERY 5 MIN PRN
OUTPATIENT
Start: 2024-11-05

## 2024-11-05 RX ORDER — FENTANYL CITRATE 50 UG/ML
50 INJECTION, SOLUTION INTRAMUSCULAR; INTRAVENOUS EVERY 5 MIN PRN
OUTPATIENT
Start: 2024-11-05

## 2024-11-07 ENCOUNTER — APPOINTMENT (OUTPATIENT)
Dept: OBSTETRICS AND GYNECOLOGY | Facility: CLINIC | Age: 39
End: 2024-11-07
Payer: COMMERCIAL

## 2024-11-07 VITALS
SYSTOLIC BLOOD PRESSURE: 120 MMHG | WEIGHT: 200 LBS | DIASTOLIC BLOOD PRESSURE: 68 MMHG | BODY MASS INDEX: 32.14 KG/M2 | HEIGHT: 66 IN

## 2024-11-07 DIAGNOSIS — Z30.430 ENCOUNTER FOR IUD INSERTION: Primary | ICD-10-CM

## 2024-11-07 ASSESSMENT — ENCOUNTER SYMPTOMS
MUSCULOSKELETAL NEGATIVE: 0
ALLERGIC/IMMUNOLOGIC NEGATIVE: 0
GASTROINTESTINAL NEGATIVE: 0
EYES NEGATIVE: 0
NEUROLOGICAL NEGATIVE: 0
RESPIRATORY NEGATIVE: 0
CARDIOVASCULAR NEGATIVE: 0
CONSTITUTIONAL NEGATIVE: 0
PSYCHIATRIC NEGATIVE: 0
ENDOCRINE NEGATIVE: 0
HEMATOLOGIC/LYMPHATIC NEGATIVE: 0

## 2024-11-07 ASSESSMENT — PAIN SCALES - GENERAL: PAINLEVEL_OUTOF10: 0-NO PAIN

## 2024-11-07 NOTE — PROGRESS NOTES
Patient ID: Alyssa Castellanos is a 39 y.o. female.  Here for a Procedure    Visit Vitals  /68 (BP Location: Right arm)        IUD Insertion    Performed by: Corinne A Bazella, MD  Authorized by: Corinne A Bazella, MD    Procedure: IUD insertion    Consent obtained by patient, parent, or legal power of  - including discussion of procedure risks and benefits, patient questions answered, and patient education provided: yes    Pregnancy risk: reasonably certain the patient is not pregnant    Date/Time of Insertion:  11/7/2024 1:53 PM  Immediately prior to procedure a time out was called: yes    Pelvic exam performed: yes    Speculum placed in vagina: yes    Cervix cleaned and prepped: yes    Tenaculum/Allis/Ring Forceps applied to cervix: yes    Uterus sound depth (cm):  8  Cervix manually dilated: no    IUD inserted without complications: yes    Strings trimmed to (cm):  3  Patient tolerated procedure well: yes    Intended removal date: 8 years

## 2024-11-08 ENCOUNTER — HOSPITAL ENCOUNTER (OUTPATIENT)
Facility: CLINIC | Age: 39
Setting detail: OUTPATIENT SURGERY
Discharge: HOME | End: 2024-11-08
Attending: STUDENT IN AN ORGANIZED HEALTH CARE EDUCATION/TRAINING PROGRAM | Admitting: STUDENT IN AN ORGANIZED HEALTH CARE EDUCATION/TRAINING PROGRAM
Payer: COMMERCIAL

## 2024-11-08 ENCOUNTER — ANESTHESIA (OUTPATIENT)
Dept: OPERATING ROOM | Facility: CLINIC | Age: 39
End: 2024-11-08
Payer: COMMERCIAL

## 2024-11-08 VITALS
TEMPERATURE: 96.8 F | DIASTOLIC BLOOD PRESSURE: 63 MMHG | RESPIRATION RATE: 16 BRPM | SYSTOLIC BLOOD PRESSURE: 95 MMHG | OXYGEN SATURATION: 100 % | WEIGHT: 196.21 LBS | BODY MASS INDEX: 31.53 KG/M2 | HEART RATE: 72 BPM | HEIGHT: 66 IN

## 2024-11-08 DIAGNOSIS — R22.32 MASS OF FINGER OF LEFT HAND: ICD-10-CM

## 2024-11-08 DIAGNOSIS — G89.18 ACUTE POST-OPERATIVE PAIN: Primary | ICD-10-CM

## 2024-11-08 LAB — PREGNANCY TEST URINE, POC: NEGATIVE

## 2024-11-08 PROCEDURE — 88304 TISSUE EXAM BY PATHOLOGIST: CPT | Performed by: STUDENT IN AN ORGANIZED HEALTH CARE EDUCATION/TRAINING PROGRAM

## 2024-11-08 PROCEDURE — 7100000010 HC PHASE TWO TIME - EACH INCREMENTAL 1 MINUTE: Performed by: STUDENT IN AN ORGANIZED HEALTH CARE EDUCATION/TRAINING PROGRAM

## 2024-11-08 PROCEDURE — 2500000005 HC RX 250 GENERAL PHARMACY W/O HCPCS: Performed by: STUDENT IN AN ORGANIZED HEALTH CARE EDUCATION/TRAINING PROGRAM

## 2024-11-08 PROCEDURE — 7100000009 HC PHASE TWO TIME - INITIAL BASE CHARGE: Performed by: STUDENT IN AN ORGANIZED HEALTH CARE EDUCATION/TRAINING PROGRAM

## 2024-11-08 PROCEDURE — 2500000004 HC RX 250 GENERAL PHARMACY W/ HCPCS (ALT 636 FOR OP/ED): Performed by: OBSTETRICS & GYNECOLOGY

## 2024-11-08 PROCEDURE — 3600000008 HC OR TIME - EACH INCREMENTAL 1 MINUTE - PROCEDURE LEVEL THREE: Performed by: STUDENT IN AN ORGANIZED HEALTH CARE EDUCATION/TRAINING PROGRAM

## 2024-11-08 PROCEDURE — 88304 TISSUE EXAM BY PATHOLOGIST: CPT | Mod: TC,SUR | Performed by: STUDENT IN AN ORGANIZED HEALTH CARE EDUCATION/TRAINING PROGRAM

## 2024-11-08 PROCEDURE — 2500000004 HC RX 250 GENERAL PHARMACY W/ HCPCS (ALT 636 FOR OP/ED): Performed by: NURSE ANESTHETIST, CERTIFIED REGISTERED

## 2024-11-08 PROCEDURE — 2500000004 HC RX 250 GENERAL PHARMACY W/ HCPCS (ALT 636 FOR OP/ED): Performed by: STUDENT IN AN ORGANIZED HEALTH CARE EDUCATION/TRAINING PROGRAM

## 2024-11-08 PROCEDURE — 3700000002 HC GENERAL ANESTHESIA TIME - EACH INCREMENTAL 1 MINUTE: Performed by: STUDENT IN AN ORGANIZED HEALTH CARE EDUCATION/TRAINING PROGRAM

## 2024-11-08 PROCEDURE — 3600000003 HC OR TIME - INITIAL BASE CHARGE - PROCEDURE LEVEL THREE: Performed by: STUDENT IN AN ORGANIZED HEALTH CARE EDUCATION/TRAINING PROGRAM

## 2024-11-08 PROCEDURE — 2720000007 HC OR 272 NO HCPCS: Performed by: STUDENT IN AN ORGANIZED HEALTH CARE EDUCATION/TRAINING PROGRAM

## 2024-11-08 PROCEDURE — 3700000001 HC GENERAL ANESTHESIA TIME - INITIAL BASE CHARGE: Performed by: STUDENT IN AN ORGANIZED HEALTH CARE EDUCATION/TRAINING PROGRAM

## 2024-11-08 PROCEDURE — 26116 EXC HAND TUM DEEP < 1.5 CM: CPT | Performed by: STUDENT IN AN ORGANIZED HEALTH CARE EDUCATION/TRAINING PROGRAM

## 2024-11-08 RX ORDER — BUPIVACAINE HYDROCHLORIDE 5 MG/ML
INJECTION, SOLUTION EPIDURAL; INTRACAUDAL AS NEEDED
Status: DISCONTINUED | OUTPATIENT
Start: 2024-11-08 | End: 2024-11-08 | Stop reason: HOSPADM

## 2024-11-08 RX ORDER — SODIUM CHLORIDE 0.9 G/100ML
IRRIGANT IRRIGATION AS NEEDED
Status: DISCONTINUED | OUTPATIENT
Start: 2024-11-08 | End: 2024-11-08 | Stop reason: HOSPADM

## 2024-11-08 RX ORDER — CEFAZOLIN 1 G/1
INJECTION, POWDER, FOR SOLUTION INTRAVENOUS AS NEEDED
Status: DISCONTINUED | OUTPATIENT
Start: 2024-11-08 | End: 2024-11-08

## 2024-11-08 RX ORDER — MIDAZOLAM HYDROCHLORIDE 1 MG/ML
INJECTION, SOLUTION INTRAMUSCULAR; INTRAVENOUS AS NEEDED
Status: DISCONTINUED | OUTPATIENT
Start: 2024-11-08 | End: 2024-11-08

## 2024-11-08 RX ORDER — LIDOCAINE HYDROCHLORIDE 10 MG/ML
INJECTION, SOLUTION INFILTRATION; PERINEURAL AS NEEDED
Status: DISCONTINUED | OUTPATIENT
Start: 2024-11-08 | End: 2024-11-08 | Stop reason: HOSPADM

## 2024-11-08 RX ORDER — HYDROCODONE BITARTRATE AND ACETAMINOPHEN 5; 325 MG/1; MG/1
1 TABLET ORAL EVERY 6 HOURS PRN
Qty: 20 TABLET | Refills: 0 | Status: SHIPPED | OUTPATIENT
Start: 2024-11-08 | End: 2024-11-13

## 2024-11-08 RX ORDER — PROPOFOL 10 MG/ML
INJECTION, EMULSION INTRAVENOUS AS NEEDED
Status: DISCONTINUED | OUTPATIENT
Start: 2024-11-08 | End: 2024-11-08

## 2024-11-08 RX ORDER — FENTANYL CITRATE 50 UG/ML
INJECTION, SOLUTION INTRAMUSCULAR; INTRAVENOUS AS NEEDED
Status: DISCONTINUED | OUTPATIENT
Start: 2024-11-08 | End: 2024-11-08

## 2024-11-08 ASSESSMENT — COLUMBIA-SUICIDE SEVERITY RATING SCALE - C-SSRS
1. IN THE PAST MONTH, HAVE YOU WISHED YOU WERE DEAD OR WISHED YOU COULD GO TO SLEEP AND NOT WAKE UP?: NO
2. HAVE YOU ACTUALLY HAD ANY THOUGHTS OF KILLING YOURSELF?: NO
6. HAVE YOU EVER DONE ANYTHING, STARTED TO DO ANYTHING, OR PREPARED TO DO ANYTHING TO END YOUR LIFE?: NO

## 2024-11-08 ASSESSMENT — PAIN SCALES - GENERAL
PAINLEVEL_OUTOF10: 0 - NO PAIN
PAIN_LEVEL: 0
PAINLEVEL_OUTOF10: 0 - NO PAIN
PAINLEVEL_OUTOF10: 0 - NO PAIN

## 2024-11-08 ASSESSMENT — PAIN - FUNCTIONAL ASSESSMENT
PAIN_FUNCTIONAL_ASSESSMENT: 0-10

## 2024-11-08 NOTE — ANESTHESIA POSTPROCEDURE EVALUATION
Patient: Alyssa Castellanos    Procedure Summary       Date: 11/08/24 Room / Location: [facility] / [facility]    Anesthesia Start: 1316 Anesthesia Stop: 1405    Procedure: Excision left small finger soft tissue mass / 60 mins (Left: Little Finger) Diagnosis:       Mass of finger of left hand      (Mass of finger of left hand [R22.32])    Surgeons: Jose Garcia DO Responsible Provider: Duarte Jackson MD    Anesthesia Type: general ASA Status: 1            Anesthesia Type: general    Vitals Value Taken Time   BP 95/53 11/08/24 1402   Temp 36 °C (96.8 °F) 11/08/24 1402   Pulse 80 11/08/24 1402   Resp 16 11/08/24 1402   SpO2 97 % 11/08/24 1402       Anesthesia Post Evaluation    Patient location during evaluation: PACU  Patient participation: complete - patient participated  Level of consciousness: awake  Pain score: 0  Pain management: adequate  Airway patency: patent  Cardiovascular status: acceptable  Respiratory status: acceptable  Hydration status: acceptable  Postoperative Nausea and Vomiting: none    No notable events documented.

## 2024-11-08 NOTE — ADDENDUM NOTE
Addended by: BAZELLA, CORINNE A on: 11/8/2024 10:27 AM     Modules accepted: Orders     At risk due to recently not drinking quite enough, comorbidities  Monitor stool output - going fairly regularly but reports recent BMs hard  Bowel regimen at facility: continue docusate BID, senna qHS, change miralax from PRN to daily, adjust as appropriate; consider bisacodyl suppository PRN if no BM in 2-3 days  Encourage mobility as tolerated, PO hydration as appropriate, high fiber diet/prune juice (in outpatient setting as appropriate)  Goal is for 1 easy BM every 1-2 days

## 2024-11-08 NOTE — DISCHARGE INSTRUCTIONS
Post-Operative Instructions   Dr. Jose Leeucler   [phone]     Dressing: You have a bandage or splint covering your operative site:    __You may remove the dressing in 5 days following surgery. Once your dressing is removed you may shower and / or wash your incision in a sink with soap and water. Do not soak your incision.   No bath tubs, hot tubs or swimming pools. After washing the wound please pat the incision dry thoroughly.   Please use mild soap. Please do not use hydrogen peroxide.   Please cover the wound with a band-aid or gauze and change it daily. Please do not apply any salves, cream, lotions or ointments to the surgical incision.   Otherwise keep incision clean and dry (no yard work, engine work, etc.)    Showering: You may shower following surgery but please adhere to above instructions regarding the dressing. If showering with bandage / splint in place please ensure that it is kept dry and covered while bathing. There are commercially available cast bags that can be used to protect your dressing while showering. If using garbage bags please make sure that there are no holes in the bag and that the bag has been sealed above the dressing. If the bandage gets wet you must contact your surgeon's office to make arrangements to be seen to have bandage changed.    Ice / Elevation: The application of ice to your surgical site after surgery will help with pain control and swelling. This can be very effective for 48-72 hours after surgery. Please be careful to avoid getting bandage wet from a leaky ice bag. Please be advised that the ice may need to be applied for longer periods of time for the cooling effect to penetrate the post-operative dressing. Elevation of the operative site above the level of the heart as much as possible for the first 48-72 hours after surgery. Use your sling if you have been provided one while standing or walking. If your fingers are not included in the dressing you are  encouraged to attempt finger range of motion as this will help with your hand swelling and ultimate recovery.     Pain Medication: If you received a regional anesthetic on the day of your surgery your arm and hand may be numb for up to 24 hours after surgery. It is important to wear your sling while the block is still effective in order to protect your arm. It is advisable to take pain medications prior to going to sleep in case the regional anesthesia medication wears off while you are sleeping. Take your pain medications as directed. Narcotic pain medications can cause lethargy, nausea and constipation. Please contact your surgeon's office and discontinue the medication if these symptoms  become problematic. Eating a regular diet, drinking fluids and walking can  help with constipation from these medications.   Avoid alcohol consumption     Additional pain control options:    ___ You are encouraged to take over the counter medications like Advil / Motrin / Ibuprofen / Aleve in addition to your prescribed pain medications after surgery     Smoking / Tobacco: Tobacco use is known to interfere with wound and fracture healing and increase post-operative pain. It can also increase your risk of poor outcomes following surgery. Please do not use tobacco or nicotine products following your surgery. This includes smokeless tobacco, or nicotine replacement products (patches, gum, etc.)     Driving: It is not advisable to operate a vehicle while using narcotic pain medications. Additionally, please be advised that you are likely to have challenges operating a car or motorcycle while you are still in your postoperative dressing and this could increase your risk of being involved in an accident and being cited for driving while physically impaired.     Warning Signs: Observe your arm / hand and incision site (if visible) for increased redness, inflammation, drainage, odor or pain that is unrelieved by rest, elevation or  medication. Please contact your surgeon's office immediately if you develop any of these issues or if you develop a fever greater than 101°.     Follow Up Appointments: Your post-operative appointment has been scheduled for     ____You do not yet have a post-operative appointment scheduled. Please contact Dr. Garcia's office at [phone] to schedule this appointment.

## 2024-11-08 NOTE — INTERVAL H&P NOTE
H&P reviewed. The patient was examined and there are no changes to the H&P.    Patient was seen and evaluated in the preoperative holding area.  She was seen with her mother present.  She continues to have a left small finger soft tissue mass.  We discussed the inherent risks of the operation.  She would like to proceed with removal of her soft tissue mass.    In addition we discussed possibly numbness and tingling to her finger as this is a fairly large soft tissue mass at her distal phalanx.  We discussed that she may have some permanent numbness and tingling however most likely this will just be temporarily numb.  We also discussed that this could be a nerve sheath tumor which would cause numbness and tingling.  We discussed the low likelihood of malignant tumor.    The indications for surgical versus nonsurgical management of the condition have been advised, including the inherent risks, benefits, prognosis of the condition.  The risks of surgery include, but are not limited to, pain, bleeding, blood clots, infection, tendon damage, nerve damage, vessel damage, and need for further surgery.  The risks also include wound healing problems, decreased long-term functionality of the wrist and hand, tendon irritation, tendon rupture, and possible need for therapy for an optimum outcome.  There is a risk of complex regional pain syndrome, incomplete recovery, incomplete relief or worsening of symptoms, and recurrence. We also discussed that medical complications are possible during and after surgery related to either anesthesia or the surgical procedure itself. Specific discussion of the risks of the proposed anesthetic plan will be discussed by the patient's anesthesia provider. All risks were reviewed in layman´s terms. The general plan for the postoperative rehabilitation course, including possible need for therapy, was reviewed at great length. The patient was given ample time to ask appropriate questions and  appeared to be satisfied with the answers provided. All questions were answered and no guarantees have been given regarding the patient's condition and treatment recommendations.

## 2024-11-08 NOTE — ANESTHESIA PREPROCEDURE EVALUATION
Patient: Alyssa Castellanos    Procedure Information       Date/Time: 11/08/24 1045    Procedure: Excision left small finger soft tissue mass / 60 mins (Left: Little Finger)    Location: [facility] OR 03 / Virtual [facility] OR    Surgeons: Will B Beucler, DO            Relevant Problems   Skin   (+) Eczema       Clinical information reviewed:   Tobacco  Allergies  Meds   Med Hx  Surg Hx  OB Status  Fam Hx  Soc   Hx        NPO Detail:  NPO/Void Status  Date of Last Liquid: 11/07/24  Time of Last Liquid: 2100  Date of Last Solid: 11/07/24  Time of Last Solid: 2100  Last Intake Type: Clear fluids; Food         Physical Exam    Airway  Mallampati: II  TM distance: >3 FB     Cardiovascular   Rhythm: regular  Rate: normal     Dental - normal exam     Pulmonary   Breath sounds clear to auscultation     Abdominal   Abdomen: soft         Anesthesia Plan    History of general anesthesia?: yes  History of complications of general anesthesia?: no    ASA 1     general     intravenous induction   Anesthetic plan and risks discussed with patient.    Plan discussed with CRNA and CAA.

## 2024-11-08 NOTE — BRIEF OP NOTE
Date: 2024  OR Location: [facility] OR    Name: Alyssa Castellanos, : 1985, Age: 39 y.o., MRN: [id number], Sex: female    Diagnosis  Pre-op Diagnosis      * Mass of finger of left hand [R22.32] Post-op Diagnosis     * Mass of finger of left hand [R22.32]     Procedures  Excision left small finger soft tissue mass / 60 mins  61741 - RI EXC MARJORIE/VASC MAL SFT TISS HAND/FNGR SUBQ <1.5CM    RI EXC MARJORIE/VAS MAL SFT TIS HAND/FNGR SUBFASC<1.5CM [73567]  Surgeons      * Will B Beucler - Primary    Resident/Fellow/Other Assistant:  Surgeons and Role:     * Santi Sanchez MD - Resident - Assisting    Staff:   Circulator: Michelle Purcell Scrub: Oksana Hollinsub Person: Duc    Anesthesia Staff: Anesthesiologist: Duarte Jackson MD  CRNA: REBEKAH Leiva-CRNA    Procedure Summary  Anesthesia: General  ASA: I  Estimated Blood Loss: 5 mL  Intra-op Medications: Administrations occurring from 1045 to 1200 on 24:  * No intraprocedure medications in log *           Anesthesia Record               Intraprocedure I/O Totals          Intake    LR bolus 20.00 mL    Total Intake 20 mL          Specimen:   ID Type Source Tests Collected by Time   1 : A. LEFT SMALL FINGER TISSUE MASS Tissue SOFT TISSUE MASS RESECTION SURGICAL PATHOLOGY EXAM Will B Beucler, DO 2024 1347                  Findings: small finger soft tissue mass     Complications:  None; patient tolerated the procedure well.     Disposition: PACU - hemodynamically stable.  Condition: stable  Specimens Collected:   ID Type Source Tests Collected by Time   1 : A. LEFT SMALL FINGER TISSUE MASS Tissue SOFT TISSUE MASS RESECTION SURGICAL PATHOLOGY EXAM Will B Beucler, DO 2024 1349     Attending Attestation:     Will B Beucler  Phone Number: [phone]

## 2024-11-20 ENCOUNTER — OFFICE VISIT (OUTPATIENT)
Dept: ORTHOPEDIC SURGERY | Facility: HOSPITAL | Age: 39
End: 2024-11-20
Payer: COMMERCIAL

## 2024-11-20 DIAGNOSIS — R22.32 MASS OF FINGER OF LEFT HAND: Primary | ICD-10-CM

## 2024-11-20 LAB
LABORATORY COMMENT REPORT: NORMAL
PATH REPORT.FINAL DX SPEC: NORMAL
PATH REPORT.GROSS SPEC: NORMAL
PATH REPORT.RELEVANT HX SPEC: NORMAL
PATH REPORT.TOTAL CANCER: NORMAL

## 2024-11-20 PROCEDURE — 99211 OFF/OP EST MAY X REQ PHY/QHP: CPT | Performed by: STUDENT IN AN ORGANIZED HEALTH CARE EDUCATION/TRAINING PROGRAM

## 2024-11-20 PROCEDURE — 1036F TOBACCO NON-USER: CPT | Performed by: STUDENT IN AN ORGANIZED HEALTH CARE EDUCATION/TRAINING PROGRAM

## 2024-11-20 ASSESSMENT — PAIN - FUNCTIONAL ASSESSMENT: PAIN_FUNCTIONAL_ASSESSMENT: 0-10

## 2024-11-20 ASSESSMENT — PAIN SCALES - GENERAL: PAINLEVEL_OUTOF10: 0 - NO PAIN

## 2024-11-20 ASSESSMENT — PAIN DESCRIPTION - DESCRIPTORS: DESCRIPTORS: NUMBNESS;TINGLING

## 2024-11-20 NOTE — PROGRESS NOTES
[facility]  Hand and Upper Extremity Service  Post Operative visit        Date of surgery: 11/8/2024  Surgery(s) performed: Excision of soft tissue mass left small finger        Subjective report: Patient underwent an excision of a soft tissue mass to her left small finger.  The intraoperative findings were consistent with a giant cell tumor however pathology has not resulted yet.  She notes some mild numbness to the radial side of her finger however she states sensation is intact.  She denies any erythema warmth or drainage.  She has been working on finger range of motion.  Overall she is doing well        Exam findings;   Right small finger.  Incision is well-healed.  Sutures are intact.  These were removed in office.  There is no erythema, warmth, drainage.  She is able to flex and extend the MP and IP joints of her small finger.  She is able to make a composite fist to the distal palmar crease.  Sensation is intact to light touch to the radial and ulnar sides of the finger.  AIN, PIN, ulnar motors intact.  Sensation intact to light touch to the radial, ulnar, median nerves.  Brisk capillary refill        Radiograph findings: None        Plan: Sutures removed in office today.  She is to continue to work on gentle range of motion.  In 1 week she may begin  Scar therapy. Scar massage and wound care techniques have been discussed.  Additional recommendations include continue working on digital range of motion.  She may progressively use the hand as tolerated.  She may follow-up as needed.        Medications Prescribed: None           Will Beucler, DO  [facility]  Department of Orthopaedic Surgery  Hand and Upper Extremity Surgery  [facility]     Dictation performed with the use of voice recognition software. Syntax and grammatical errors may exist.

## 2024-11-20 NOTE — LETTER
November 20, 2024     Patient: Alyssa Castellanos   YOB: 1985   Date of Visit: 11/20/2024       To Whom It May Concern:    Alyssa Castellanos was seen in my clinic on 11/20/2024 at 8:15 am. Please excuse Alyssa for her absence from work on this day to make the appointment.    If you have any questions or concerns, please don't hesitate to call.         Sincerely,         Will B Beucler, DO        CC: No Recipients

## 2024-11-25 NOTE — OP NOTE
Excision left small finger soft tissue mass / 60 mins (L) Operative Note     Date: 2024  OR Location: [facility] OR    Name: Alyssa Castellanos, : 1985, Age: 39 y.o., MRN: [id number], Sex: female    Diagnosis  Pre-op Diagnosis      * Mass of finger of left hand [R22.32] Post-op Diagnosis     * Mass of finger of left hand [R22.32]     Procedures  Excision left small finger soft tissue mass / 60 mins  96107 - AR EXC MARJORIE/VASC MAL SFT TISS HAND/FNGR SUBQ <1.5CM    AR EXC MARJORIE/VAS MAL SFT TIS HAND/FNGR SU[facility]<1.5CM [41309]  Surgeons      * Will B Beucler - Primary    Resident/Fellow/Other Assistant:  Surgeons and Role:     * Santi Sanchez MD - Resident - Assisting    Staff:   Circulator: Michelle Purcell Scrub: Oksana Hollinsub Person: Duc    Anesthesia Staff: Anesthesiologist: Duarte Jackson MD  CRNA: REBEKAH Leiva-CRNA    Procedure Summary  Anesthesia: General  ASA: I  Estimated Blood Loss: 2mL  Intra-op Medications: Administrations occurring from 1045 to 1200 on 24:  * No intraprocedure medications in log *           Anesthesia Record               Intraprocedure I/O Totals          Intake    LR bolus 20.00 mL    Total Intake 20 mL          Specimen:   ID Type Source Tests Collected by Time   1 : A. LEFT SMALL FINGER TISSUE MASS Tissue SOFT TISSUE MASS RESECTION SURGICAL PATHOLOGY EXAM Will B Beucler, DO 2024 1349                 Drains and/or Catheters: * None in log *    Tourniquet Times:     Total Tourniquet Time Documented:  area (Left) - 14 minutes  Total: area (Left) - 14 minutes      Implants:     Findings: Mass over the distal phalanx of the small finger consistent with giant cell tumor     Indications: Aylssa Castellanos is an 39 y.o. female who is having surgery for Mass of finger of left hand [R22.32].     The patient was seen in the preoperative area. The risks, benefits, complications, treatment options, non-operative alternatives, expected recovery and outcomes were discussed  with the patient. The possibilities of reaction to medication, pulmonary aspiration, injury to surrounding structures, bleeding, recurrent infection, the need for additional procedures, failure to diagnose a condition, and creating a complication requiring transfusion or operation were discussed with the patient. The patient concurred with the proposed plan, giving informed consent.  The site of surgery was properly noted/marked if necessary per policy. The patient has been actively warmed in preoperative area. Preoperative antibiotics have been ordered and given within 1 hours of incision. Venous thrombosis prophylaxis are not indicated.    Procedure Details: Indications for procedure:     I met and evaluated the patient in the preoperative holding area today prior to the patient receiving any sedation or other medications which may alter their mental status. I confirmed their identity via the facility's protocol. I reviewed the patient's history, physical exam, and recommendation for surgery. The indications for surgical versus nonsurgical management of the condition were discussed, including the inherent risks, benefits, prognosis of the condition.  The risks of surgery include, but are not limited to, pain, bleeding, infection, tendon damage, nerve damage, vessel damage, and need for further surgery.  The risks also include wound healing problems, decreased long-term functionality of the wrist and hand, tendon irritation, tendon rupture, and possible need for therapy for an optimum outcome.  There is a risk of complex regional pain syndrome, incomplete recovery, incomplete relief or worsening of symptoms, and recurrence.  We also discussed that medical complications are possible during and after surgery related to either anesthesia or the surgical procedure itself. Specific discussion of the risks of the proposed anesthetic plan will be discussed by the patient's anesthesia provider. All risks were reviewed in  layman´s terms. The patient was given ample time to ask appropriate questions and appeared to be satisfied with the answers provided. All questions were answered and no guarantees have been given regarding the patient's condition and treatment recommendations. The general plan for the postoperative rehabilitation course, including possible need for therapy, was reviewed at great length. Informed consent was signed by all appropriate parties. The surgical site was marked in ink by myself.        Procedure in Detail:  The patient was transported to the operating room and placed in the supine position on the operating table. All extremities and prominences were appropriately padded. Adequate anesthesia was induced. A non-sterile tourniquet was applied high on the left arm. The left arm was prepped and draped in standard sterile fashion. A time-out was conducted prior to beginning the operation to confirm the patient's identity, planned procedures, laterality of procedures, and that appropriate antibiotics had been administered within 30 minutes of incision. The left upper extremity was exsanguinated with an Esmarch bandage, and the tourniquet was inflated to 250mmHg.     Oblique incision was made over the distal phalanx of the left small finger.  Full-thickness skin flaps were elevated.  There is a mass present with a yellow-orange and brown appearance.  This was consistent with a giant cell tumor.  Wittler scissors were used to bluntly dissect around the mass.  Careful dissection was carried around the ulnar and radial neurovascular bundles.  These were protected throughout the case.  Dissection was carried down all the way down to the flexor sheath.  The mass was removed in 1 piece.  This did not appear to enter the joint.  The wound was copiously irrigated.  The tourniquet was deflated and hemostasis was obtained.  The finger was pink and well-perfused.  4-0 nylon sutures were used for skin closure.  Local  anesthetic was used for digital block.  A bulky well-padded dressing was placed.     The patient was transferred to the hospital bed and transported to the post-anesthesia care unit in good condition having tolerated the procedure well. All sponge, needle, and instrument counts were correct x2. Postoperatively, the digits were pink and well perfused and the hand compartments and soft tissues were supple. No complications were apparent.    Postoperative Plan:  The patient will be nonweightbearing on their operative site. Their dressing will be removed in 5 days  They will return to clinic in 2 weeks. X-rays are not needed on return visit.  Complications:  None; patient tolerated the procedure well.    Disposition: PACU - hemodynamically stable.  Condition: stable                 Additional Details: Mass consistent with giant cell tumor    Attending Attestation: I performed the procedure.    Jose Garcia  Phone Number: [phone]

## 2025-02-14 ENCOUNTER — APPOINTMENT (OUTPATIENT)
Dept: OPHTHALMOLOGY | Facility: CLINIC | Age: 40
End: 2025-02-14
Payer: COMMERCIAL

## 2025-02-14 DIAGNOSIS — Z00.00 PHYSICAL EXAM, ANNUAL: ICD-10-CM

## 2025-02-14 DIAGNOSIS — H10.45 CHRONIC ALLERGIC CONJUNCTIVITIS: ICD-10-CM

## 2025-02-14 DIAGNOSIS — H52.03 HYPERMETROPIA OF BOTH EYES: Primary | ICD-10-CM

## 2025-02-14 PROCEDURE — 92004 COMPRE OPH EXAM NEW PT 1/>: CPT | Performed by: STUDENT IN AN ORGANIZED HEALTH CARE EDUCATION/TRAINING PROGRAM

## 2025-02-14 RX ORDER — PREDNISONE 10 MG/1
TABLET ORAL
COMMUNITY
Start: 2025-02-12

## 2025-02-14 RX ORDER — FAMOTIDINE 40 MG/1
1 TABLET, FILM COATED ORAL
COMMUNITY
Start: 2025-01-31

## 2025-02-14 ASSESSMENT — CUP TO DISC RATIO
OD_RATIO: .2
OS_RATIO: .2

## 2025-02-14 ASSESSMENT — ENCOUNTER SYMPTOMS
RESPIRATORY NEGATIVE: 0
CONSTITUTIONAL NEGATIVE: 0
ENDOCRINE NEGATIVE: 0
GASTROINTESTINAL NEGATIVE: 0
ALLERGIC/IMMUNOLOGIC NEGATIVE: 0
HEMATOLOGIC/LYMPHATIC NEGATIVE: 0
CARDIOVASCULAR NEGATIVE: 0
EYES NEGATIVE: 0
MUSCULOSKELETAL NEGATIVE: 0
NEUROLOGICAL NEGATIVE: 0
PSYCHIATRIC NEGATIVE: 0

## 2025-02-14 ASSESSMENT — SLIT LAMP EXAM - LIDS
COMMENTS: GOOD POSITION
COMMENTS: GOOD POSITION

## 2025-02-14 ASSESSMENT — REFRACTION_MANIFEST
OD_CYLINDER: SPHERE
OD_SPHERE: +1.00
OS_AXIS: 165
OS_CYLINDER: -0.75
OS_SPHERE: +1.00

## 2025-02-14 ASSESSMENT — CONF VISUAL FIELD
OS_NORMAL: 1
OD_INFERIOR_NASAL_RESTRICTION: 0
METHOD: COUNTING FINGERS
OS_INFERIOR_TEMPORAL_RESTRICTION: 0
OS_INFERIOR_NASAL_RESTRICTION: 0
OS_SUPERIOR_NASAL_RESTRICTION: 0
OD_SUPERIOR_TEMPORAL_RESTRICTION: 0
OD_SUPERIOR_NASAL_RESTRICTION: 0
OD_NORMAL: 1
OD_INFERIOR_TEMPORAL_RESTRICTION: 0
OS_SUPERIOR_TEMPORAL_RESTRICTION: 0

## 2025-02-14 ASSESSMENT — VISUAL ACUITY
METHOD: SNELLEN - LINEAR
OS_SC: 20/20
OS_SC+: -2
OD_SC: 20/20
OD_SC+: -1

## 2025-02-14 ASSESSMENT — TONOMETRY
IOP_METHOD: GOLDMANN APPLANATION
OS_IOP_MMHG: 18
OD_IOP_MMHG: 16

## 2025-02-14 ASSESSMENT — EXTERNAL EXAM - RIGHT EYE: OD_EXAM: NORMAL

## 2025-02-14 ASSESSMENT — EXTERNAL EXAM - LEFT EYE: OS_EXAM: NORMAL

## 2025-02-15 PROBLEM — H10.45 CHRONIC ALLERGIC CONJUNCTIVITIS: Status: ACTIVE | Noted: 2025-02-15

## 2025-02-15 NOTE — PROGRESS NOTES
Assessment/Plan   Diagnoses and all orders for this visit:  Hypermetropia of both eyes  Chronic allergic conjunctivitis   -stable refractive error-patient doing well without specs BCVA 20/20 OD+OS without correction (SC)  -normal binocular exam and ocular health on DFE  -hx of seasonal allergies with some mild ocular symptoms-advised OTC combo antihistamine/mast cell drops, cold compresses, and AT's prn    RTC 1 year for annual with DFE and MRX

## 2025-05-22 ENCOUNTER — APPOINTMENT (OUTPATIENT)
Dept: RADIOLOGY | Facility: HOSPITAL | Age: 40
End: 2025-05-22
Payer: COMMERCIAL

## 2025-05-22 ENCOUNTER — HOSPITAL ENCOUNTER (EMERGENCY)
Facility: HOSPITAL | Age: 40
Discharge: HOME | End: 2025-05-23
Payer: COMMERCIAL

## 2025-05-22 DIAGNOSIS — S89.92XA INJURY OF LEFT KNEE, INITIAL ENCOUNTER: Primary | ICD-10-CM

## 2025-05-22 DIAGNOSIS — S72.415A CLOSED NONDISPLACED FRACTURE OF CONDYLE OF LEFT FEMUR, INITIAL ENCOUNTER: ICD-10-CM

## 2025-05-22 PROCEDURE — 73700 CT LOWER EXTREMITY W/O DYE: CPT | Mod: LT

## 2025-05-22 PROCEDURE — 73564 X-RAY EXAM KNEE 4 OR MORE: CPT | Mod: LT

## 2025-05-22 PROCEDURE — 99284 EMERGENCY DEPT VISIT MOD MDM: CPT | Mod: 25

## 2025-05-22 PROCEDURE — 73564 X-RAY EXAM KNEE 4 OR MORE: CPT | Mod: LEFT SIDE | Performed by: RADIOLOGY

## 2025-05-22 ASSESSMENT — PAIN SCALES - GENERAL: PAINLEVEL_OUTOF10: 5 - MODERATE PAIN

## 2025-05-22 ASSESSMENT — COLUMBIA-SUICIDE SEVERITY RATING SCALE - C-SSRS
2. HAVE YOU ACTUALLY HAD ANY THOUGHTS OF KILLING YOURSELF?: NO
6. HAVE YOU EVER DONE ANYTHING, STARTED TO DO ANYTHING, OR PREPARED TO DO ANYTHING TO END YOUR LIFE?: NO
1. IN THE PAST MONTH, HAVE YOU WISHED YOU WERE DEAD OR WISHED YOU COULD GO TO SLEEP AND NOT WAKE UP?: NO

## 2025-05-22 ASSESSMENT — PAIN DESCRIPTION - LOCATION: LOCATION: KNEE

## 2025-05-22 ASSESSMENT — PAIN - FUNCTIONAL ASSESSMENT: PAIN_FUNCTIONAL_ASSESSMENT: 0-10

## 2025-05-22 ASSESSMENT — PAIN DESCRIPTION - ORIENTATION: ORIENTATION: LEFT

## 2025-05-22 NOTE — Clinical Note
Alyssa Castellanos was seen and treated in our emergency department on 5/22/2025.  She may return to work on 05/26/2025.       If you have any questions or concerns, please don't hesitate to call.      Kalyan Giron PA-C

## 2025-05-23 VITALS
TEMPERATURE: 97.3 F | BODY MASS INDEX: 28.12 KG/M2 | RESPIRATION RATE: 17 BRPM | SYSTOLIC BLOOD PRESSURE: 113 MMHG | HEART RATE: 69 BPM | DIASTOLIC BLOOD PRESSURE: 76 MMHG | HEIGHT: 66 IN | WEIGHT: 175 LBS | OXYGEN SATURATION: 98 %

## 2025-05-23 PROCEDURE — 73700 CT LOWER EXTREMITY W/O DYE: CPT | Mod: LEFT SIDE | Performed by: RADIOLOGY

## 2025-05-23 RX ORDER — IBUPROFEN 800 MG/1
800 TABLET, FILM COATED ORAL 3 TIMES DAILY
Qty: 21 TABLET | Refills: 0 | Status: SHIPPED | OUTPATIENT
Start: 2025-05-23 | End: 2025-05-30

## 2025-05-23 RX ORDER — IBUPROFEN 800 MG/1
800 TABLET, FILM COATED ORAL 3 TIMES DAILY
Qty: 21 TABLET | Refills: 0 | Status: SHIPPED | OUTPATIENT
Start: 2025-05-23 | End: 2025-05-23

## 2025-05-23 NOTE — DISCHARGE INSTRUCTIONS
You have been seen at a [facility] facility.  It was found that you have a small chip fracture on the inner part of your knee.  Please use ibuprofen and ice to manage your pain at home.  Stay off of the leg and use crutches for ambulation.  Wear your knee immobilizer at all times.  Please follow-up with your primary care provider in the next 1 to 2 days for further evaluation and routine follow-up.  Please return to the emergency room if having any worsening symptoms.  Please follow-up with your orthopedic as scheduled.  For any problems that may arise sooner you may follow-up with the walk-in clinic.  Please follow-up at the  orthopedic injury clinic at [facility] sports [facility] [place]  [address]  [address] [place] [address]  [phone]  Open for walk-ins Monday through Friday 8:30 AM through 4 PM

## 2025-05-23 NOTE — ED PROVIDER NOTES
Chief Complaint   Patient presents with    Knee Injury     Per patient injured left knee playing basketball      HPI:   Alyssa Castellanos is an 39 y.o. female presenting to the ED for chief complaint of left knee injury.  She explains that she has had chronic issues with this knee for the last 15 years and explains that the patella was subluxed.  She was followed closely with an orthopedic doctor regarding this.  Explains that yesterday evening she was playing in a basketball game when she experienced an injury during the game.  She does not recall what the exact mechanism however explains that one of her opponents did strike her on the leg, she was wearing her knee brace at the times of the patella did not sublux however it felt that it was going to.  She reports increased swelling in the knee and increased pain with weightbearing.  Pain is not radicular no paresthesias or paresis.  No head strike loss conscious or other injuries or complaints.    RX Allergies[1]:  Medical History[2]  Surgical History[3]  Family History[4]     Physical Exam  Vitals and nursing note reviewed.   Constitutional:       General: She is not in acute distress.     Appearance: She is well-developed.   HENT:      Head: Normocephalic and atraumatic.   Eyes:      Conjunctiva/sclera: Conjunctivae normal.   Cardiovascular:      Rate and Rhythm: Normal rate and regular rhythm.      Heart sounds: No murmur heard.  Pulmonary:      Effort: Pulmonary effort is normal. No respiratory distress.      Breath sounds: Normal breath sounds.   Abdominal:      Palpations: Abdomen is soft.      Tenderness: There is no abdominal tenderness.   Musculoskeletal:         General: No swelling.      Cervical back: Neck supple.      Comments: Exam the right knee does show a moderate effusion ligaments are stable to AP varus and valgus stress no joint line tenderness positive patellar apprehension positive Baker's cyst no distal edema   Skin:     General: Skin is warm and  dry.      Capillary Refill: Capillary refill takes less than 2 seconds.      Comments: No erythema or open lesions wounds or sores   Neurological:      Mental Status: She is alert.   Psychiatric:         Mood and Affect: Mood normal.        VS: As documented in the triage note and EMR flowsheet from this visit were reviewed.    External Records Reviewed: I reviewed recent and relevant outside records including: Compared x-ray to previous x-ray in 2019 of the left knee    Medical Decision Making: This is a 39-year-old female presenting to the ED for chief complaint of acute on chronic left knee pain.  Explains she was in a basketball game when she felt that her patella may have attempted to sublux.  She explains she was wearing her knee brace at the time that this occurred yesterday.  On my evaluation the knee does have a moderate effusion.  It is not significantly erythematous or tender she does have full range of motion.  Ligaments are stable to AP varus and valgus stress.  She had no joint line tenderness.  She did have positive patellar apprehension and swelling posteriorly consistent with a Baker's cyst.  She had x-rays of the knee that suggested a chip fracture of the medial condyle of the femur.  CT did confirm this.  She has a follow-up with her orthopedic on Wednesday.  She was placed in a knee immobilizer and given crutches for ambulation.  Recommended RICE protocol and she understands return precautions.  Considered narcotics however her pain was controlled.  She is appropriate for outpatient management and was given information for the orthopedic walk-in clinic as well.  Understands return precautions.    Diagnoses as of 05/23/25 0143   Injury of left knee, initial encounter   Closed nondisplaced fracture of condyle of left femur, initial encounter     Counseling: Spoke with the patient and discussed today´s findings, in addition to providing specific details for the plan of care and expected course.   Patient was given the opportunity to ask questions.    Discussed return precautions and importance of follow-up.  Advised to follow-up with orthopedics.  I specifically advised to return to the ED for changing or worsening symptoms, new symptoms, complaint specific precautions, and precautions listed on the discharge paperwork.  Educated on the common potential side effects of medications prescribed.    I advised the patient that the emergency evaluation and treatment provided today doesn't end their need for medical care. It is very important that they follow-up with their primary care provider or other specialist as instructed.    The plan of care was mutually agreed upon with the patient. The patient and/or family were given the opportunity to ask questions. All questions asked today in the ED were answered to the best of my ability with today's information.    This report was transcribed using voice recognition software.  Every effort was made to ensure accuracy, however, inadvertently computerized transcription errors may be present.         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

## 2025-05-27 ENCOUNTER — OFFICE VISIT (OUTPATIENT)
Dept: OBSTETRICS AND GYNECOLOGY | Facility: HOSPITAL | Age: 40
End: 2025-05-27
Payer: COMMERCIAL

## 2025-05-27 VITALS
HEART RATE: 74 BPM | SYSTOLIC BLOOD PRESSURE: 104 MMHG | BODY MASS INDEX: 30.22 KG/M2 | HEIGHT: 64 IN | WEIGHT: 177 LBS | DIASTOLIC BLOOD PRESSURE: 67 MMHG

## 2025-05-27 DIAGNOSIS — N76.1 CHRONIC VAGINITIS: ICD-10-CM

## 2025-05-27 DIAGNOSIS — Z01.419 WELL WOMAN EXAM: Primary | ICD-10-CM

## 2025-05-27 PROCEDURE — 99395 PREV VISIT EST AGE 18-39: CPT | Performed by: STUDENT IN AN ORGANIZED HEALTH CARE EDUCATION/TRAINING PROGRAM

## 2025-05-27 PROCEDURE — 99385 PREV VISIT NEW AGE 18-39: CPT | Performed by: STUDENT IN AN ORGANIZED HEALTH CARE EDUCATION/TRAINING PROGRAM

## 2025-05-27 RX ORDER — MILK THISTLE 150 MG
CAPSULE ORAL
COMMUNITY

## 2025-05-27 RX ORDER — CHOLECALCIFEROL (VITAMIN D3) 25 MCG
25 TABLET ORAL DAILY
COMMUNITY

## 2025-05-27 SDOH — ECONOMIC STABILITY: FOOD INSECURITY: WITHIN THE PAST 12 MONTHS, YOU WORRIED THAT YOUR FOOD WOULD RUN OUT BEFORE YOU GOT MONEY TO BUY MORE.: NEVER TRUE

## 2025-05-27 SDOH — ECONOMIC STABILITY: FOOD INSECURITY: WITHIN THE PAST 12 MONTHS, THE FOOD YOU BOUGHT JUST DIDN'T LAST AND YOU DIDN'T HAVE MONEY TO GET MORE.: NEVER TRUE

## 2025-05-27 SDOH — ECONOMIC STABILITY: TRANSPORTATION INSECURITY
IN THE PAST 12 MONTHS, HAS THE LACK OF TRANSPORTATION KEPT YOU FROM MEDICAL APPOINTMENTS OR FROM GETTING MEDICATIONS?: NO

## 2025-05-27 SDOH — ECONOMIC STABILITY: TRANSPORTATION INSECURITY
IN THE PAST 12 MONTHS, HAS LACK OF TRANSPORTATION KEPT YOU FROM MEETINGS, WORK, OR FROM GETTING THINGS NEEDED FOR DAILY LIVING?: NO

## 2025-05-27 ASSESSMENT — LIFESTYLE VARIABLES
HOW MANY STANDARD DRINKS CONTAINING ALCOHOL DO YOU HAVE ON A TYPICAL DAY: PATIENT DOES NOT DRINK
AUDIT-C TOTAL SCORE: 0
SKIP TO QUESTIONS 9-10: 1
HOW OFTEN DO YOU HAVE SIX OR MORE DRINKS ON ONE OCCASION: NEVER
HOW OFTEN DO YOU HAVE A DRINK CONTAINING ALCOHOL: NEVER

## 2025-05-27 ASSESSMENT — SOCIAL DETERMINANTS OF HEALTH (SDOH)
WITHIN THE LAST YEAR, HAVE YOU BEEN HUMILIATED OR EMOTIONALLY ABUSED IN OTHER WAYS BY YOUR PARTNER OR EX-PARTNER?: NO
WITHIN THE LAST YEAR, HAVE YOU BEEN AFRAID OF YOUR PARTNER OR EX-PARTNER?: NO
WITHIN THE LAST YEAR, HAVE TO BEEN RAPED OR FORCED TO HAVE ANY KIND OF SEXUAL ACTIVITY BY YOUR PARTNER OR EX-PARTNER?: NO
WITHIN THE LAST YEAR, HAVE YOU BEEN KICKED, HIT, SLAPPED, OR OTHERWISE PHYSICALLY HURT BY YOUR PARTNER OR EX-PARTNER?: NO

## 2025-05-27 ASSESSMENT — PATIENT HEALTH QUESTIONNAIRE - PHQ9
1. LITTLE INTEREST OR PLEASURE IN DOING THINGS: NOT AT ALL
2. FEELING DOWN, DEPRESSED OR HOPELESS: NOT AT ALL
SUM OF ALL RESPONSES TO PHQ9 QUESTIONS 1 & 2: 0

## 2025-05-27 ASSESSMENT — PAIN SCALES - GENERAL: PAINLEVEL_OUTOF10: 0-NO PAIN

## 2025-05-27 NOTE — PROGRESS NOTES
Chief Complaint   Patient presents with    Left Knee - Pain       Consulting physician: Sweetie Whitaker MD    A report with my findings and recommendations will be sent to the primary and referring physician via written or electronic means when information is available    History of Present Illness:  Alyssa Castellanos is a 39 y.o. female w a history of patella maltracking and pat tendinopathy who presented on 05/28/2025 with L knee pain - new acute injury 5/21/25:  she was playing basketball while wearing her brace and another player ran into her leg. She did not think that her patella dislocated, but felt that it might.  + swelling and pain with weight bearing. Swelling and pain have improved.      R knee is fine.     Past MSK HX:  Specialty Problems    None  Mass on finger removed - Beucler   Had a baby 7/2023 - Alexis  7/2020 L shoulder impingement  [id number] , 2016, 2018- Left Severe patella maltracking and malalignment with a history of patellar subluxation and dislocation episodes (managed with brace and PT with Dr. Tre Wilkins)  10/2011: Right patellar tendonitis and PFS (managed by Dr. Tre Wilkins with HEP, refused PT)  3/2013: Left traumatic dislocation with recommended formal PT and MRI if not improved (Managed by Dr. 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 Studied sports admin    tobacco use (any type) no  Alcohol use: no    Medications: Medications Ordered Prior to Encounter[1]      Allergies:  Allergies[2]     Physical Exam:    Visit Vitals  LMP  (LMP Unknown)   OB Status IUD   Smoking Status Never      General appearance: Well-appearing well-nourished  Psych: Normal mood and affect    Neuro: Normal sensation to light touch throughout the involved extremities  Vascular: No extremity  edema or discoloration.  Skin: negative.  Lymphatic: no regional lymphadenopathy present.  Eyes: no conjunctival injection.    BILATERAL  Knee exam:     Inspection:  Effusion: None R, ++ L  Erythema No  Warmth No  Ecchymosis No  Quadriceps atrophy No    Knee ROM:    Flexion (140): Full, pain free R, left loss of 40  Extension (0): Full, pain free    Hip ROM:   Hip flexion (supine) (140) Full, pain free  Hip extension (prone) (15) Full, pain free  Hip abduction (45) Full, pain free  Hip adduction (30-45)Full, pain free  Hip IR at 90 flexion (40) Full, pain free  Hip ER at 90 Flexion(40-50) Full, pain free        Palpation:    TTP Medial joint line No  TTP Lateral joint line No  TTP MCL No  TTP LCL No    TTP Inferior medial patellar facets No R + L  TTP Superior medial patellar facets No R + L  TTP Inferior lateral patellar facets No  TTP Superior lateral patellar facet No    TTP Medial femoral condyle No R + L  TTP Lateral femoral condyle No  TTP Medial tibial plateau No  TTP Lateral tibial plateau No  TTP Tibial tubercle No  TTP Inferior pole patella No  TTP Fibular head No  TTP Hoffa's fat pad No    TTP Distal hamstring tendon No  TTP Pes anserine bursa No  TTP Quad tendon No  TTP Patellar tendon No  TTP Proximal gastrocnemius tendon No  TTP Distal iliotibial band, Gerdy's tubercle No    TTP Hip joint line No    Patellar testing:   quadrants of glide: normal  Pain w/ patellar compression No  Apprehension Negative  Inhibition Negative    Ligament testing:   Lachman Negative   Anterior drawer Negative   Valgus stress testing performed at 0 and 20 Negative  - feels patella instability L  Varus stress testing performed at 0 and 20 Negative   - feels patella instability L  Posterior drawer Negative       Meniscus tests:   Emmanuel's Negative R, inadequate exam L due to loss of motion      Strength:  Quadriceps pain free, 5/5 R, L 4-  Hamstring pain free, 5/5 R, L 4/5  Hip abduction pain free, 5/5  Hip adduction pain  free, 5/5  Hip flexion seated pain free, 5/5  Hip flexion supine pain free, 5/5  Hip extension pain free, 5/5    Flexibility:   Popliteal angle L def  Popliteal angle R 10  Heel to butt: 10 cm R,        Functional:  unable    Gait unable to wt bear with out crutches      Imaging:  CT 5/22/25 Acute cortical chip fracture of the anteromedial femoral condyle.  Several intra-articular fragments noted.      Large joint effusion. Small Baker's cyst with possible rupture.      Degenerative changes    L knee xray 5/22/25 Intra-articular cortical chip or avulsion fractures of uncertain  donor site. Further evaluation with CT is recommended.      Large knee joint effusion.        Imaging was personally interpreted and reviewed with the patient and/or family    Patient was prescribed a t scope for patella dislocation L [orthosis] for [diagnosis].The patient is ambulatory with or without aid; but, has weakness, instability and/or deformity of their [right / left] [extremity] which requires stabilization from this orthosis to improve their function.      Verbal and written instructions for the use, wear schedule, cleaning and application of this item were given.  Patient was instructed that should the brace result in increased pain, decreased sensation, increased swelling, or an overall worsening of their medical condition, to please contact our office immediately.     Orthotic management and training was provided for skin care, modifications due to healing tissues, edema changes, interruption in skin integrity, and safety precautions with the orthosis.    Impression and Plan:  Alyssa Castellanos is a 39 y.o. female  and   who presented on 05/28/2025  with new acute L knee injury that is most consistent with recurrent pat dislocation with new loose fragments in joint.     Objective: Large effusion in left knee, loss of 40 degrees of flexion left knee, TTP left inferior and superior medial patellar  facets and medial femoral condyle, positive patellar apprehension, negative Lachman, patellar apprehension with varus and valgus stress testing left, unable to weight-bear    Plan: MR for surgical planning.  Fitted with a T scope 2-day should discontinue use of knee immobilizer.  Continue to ice 3-4 times per day for 20 minutes at a time.  We discussed range of motion exercises and quad sets.      ** Please excuse any errors in grammar or translation related to this dictation. Voice recognition software was utilized to prepare this document. **         [1]   Current Outpatient Medications on File Prior to Visit   Medication Sig Dispense Refill    azelastine (Astelin) 137 mcg (0.1 %) nasal spray Administer 1 spray into each nostril 2 times a day as needed.      cholecalciferol (Vitamin D3) 25 mcg (1,000 units) tablet Take 1 tablet (25 mcg) by mouth once daily.      elderberry fruit 350 mg capsule Take by mouth.      EPINEPHrine 0.3 mg/0.3 mL injection syringe 1 INJECTION(S) AS NEEDED INJECTION      famotidine (Pepcid) 40 mg tablet Take 1 tablet (40 mg) by mouth every 12 hours.      fluticasone (Flonase) 50 mcg/actuation nasal spray Administer 1 spray into each nostril once daily as needed for rhinitis. Shake gently. Before first use, prime pump. After use, clean tip and replace cap.      ibuprofen 800 mg tablet Take 1 tablet (800 mg) by mouth 3 times a day for 7 days. 21 tablet 0    montelukast (Singulair) 10 mg tablet Take 1 tablet (10 mg) by mouth early in the morning..      predniSONE (Deltasone) 10 mg tablet       [DISCONTINUED] ibuprofen 800 mg tablet Take 1 tablet (800 mg) by mouth 3 times a day for 7 days. 21 tablet 0    [DISCONTINUED] TIRZEPATIDE, WEIGHT LOSS, SUBQ Inject 30 mg under the skin 1 (one) time per week.       No current facility-administered medications on file prior to visit.   [2]   Allergies  Allergen Reactions    Shellfish Containing Products Swelling     Can't remember exactly

## 2025-05-27 NOTE — PROGRESS NOTES
Subjective   Alyssa Castellanos is a 39 y.o. y.o. here for a routine exam. Current concerns: vaginal discharge which is improved with azo boric acid tabs. Has noticed this since Mirena placed.      Gynecologic History  No LMP recorded (lmp unknown). (Menstrual status: IUD)..  Contraception: IUD  Last Pap: 2022. Results were: negative cotest  HPV vaccination: yes      Obstetric History  OB History    Para Term  AB Living   1 1 1   1   SAB IAB Ectopic Multiple Live Births       1      # Outcome Date GA Lbr Deion/2nd Weight Sex Type Anes PTL Lv   1 Term 23 40w0d  3.175 kg M Vag-Spont EPI N ESTHER       Objective   Physical Exam  Vitals and nursing note reviewed.   Constitutional:       Appearance: Normal appearance.   HENT:      Head: Normocephalic and atraumatic.      Nose: Nose normal.      Mouth/Throat:      Mouth: Mucous membranes are moist.   Eyes:      Extraocular Movements: Extraocular movements intact.      Conjunctiva/sclera: Conjunctivae normal.   Pulmonary:      Effort: Pulmonary effort is normal.   Chest:      Chest wall: No deformity or swelling.   Breasts:     Breasts are symmetrical.      Right: Normal.      Left: Normal.   Abdominal:      General: There is no distension.      Palpations: Abdomen is soft. There is no mass.      Tenderness: There is no abdominal tenderness.   Genitourinary:     General: Normal vulva.      Vagina: Normal.      Cervix: Normal.      Comments: IUD strings not seen  Musculoskeletal:      Cervical back: Normal range of motion.   Skin:     General: Skin is warm and dry.   Neurological:      General: No focal deficit present.      Mental Status: She is alert.   Psychiatric:         Mood and Affect: Mood normal.         Behavior: Behavior normal.         Thought Content: Thought content normal.         Judgment: Judgment normal.         Assessment/Plan   Unremarkable gynecologic exam.    Vaginitis panel collected given discharge. Urine GC/CT obtained.    Follow up for  well care or as needed.    Chloe Martinez MD

## 2025-05-28 ENCOUNTER — APPOINTMENT (OUTPATIENT)
Dept: ORTHOPEDIC SURGERY | Facility: CLINIC | Age: 40
End: 2025-05-28
Payer: COMMERCIAL

## 2025-05-28 DIAGNOSIS — S83.015A LATERAL DISLOCATION OF LEFT PATELLA, INITIAL ENCOUNTER: Primary | ICD-10-CM

## 2025-05-28 DIAGNOSIS — M25.462 EFFUSION OF LEFT KNEE: ICD-10-CM

## 2025-05-28 DIAGNOSIS — M25.662 DECREASED RANGE OF MOTION OF LEFT KNEE: ICD-10-CM

## 2025-05-28 DIAGNOSIS — M23.42 LOOSE BODY OF LEFT KNEE: ICD-10-CM

## 2025-05-28 DIAGNOSIS — Z11.3 ROUTINE SCREENING FOR STI (SEXUALLY TRANSMITTED INFECTION): Primary | ICD-10-CM

## 2025-05-28 DIAGNOSIS — A59.9 TRICHOMONAS INFECTION: Primary | ICD-10-CM

## 2025-05-28 DIAGNOSIS — R26.89 UNABLE TO BEAR WEIGHT: ICD-10-CM

## 2025-05-28 DIAGNOSIS — Z01.419 WELL WOMAN EXAM: ICD-10-CM

## 2025-05-28 LAB
BV SCORE VAG QL: NORMAL
C TRACH RRNA SPEC QL NAA+PROBE: NOT DETECTED
N GONORRHOEA RRNA SPEC QL NAA+PROBE: NOT DETECTED
QUEST GC CT AMPLIFIED (ALWAYS MESSAGE): NORMAL
T VAGINALIS RRNA SPEC QL NAA+PROBE: DETECTED

## 2025-05-28 RX ORDER — METRONIDAZOLE 500 MG/1
500 TABLET ORAL 2 TIMES DAILY
Qty: 14 TABLET | Refills: 0 | Status: SHIPPED | OUTPATIENT
Start: 2025-05-28 | End: 2025-06-04

## 2025-05-28 NOTE — LETTER
May 29, 2025     Kalyan Giron PA-C  [address]  [facility] [address]    Patient: Alyssa Castellanos   YOB: 1985   Date of Visit: 5/28/2025       Dear Dr. Kalyan Giron PA-C:    Thank you for referring Alyssa Castellanos to me for evaluation. Below are my notes for this consultation.  If you have questions, please do not hesitate to call me. I look forward to following your patient along with you.       Sincerely,     Lizette Wilkins MD      CC: No Recipients  ______________________________________________________________________________________    Chief Complaint   Patient presents with   • Left Knee - Pain       Consulting physician: Sweetie Whitaker MD    A report with my findings and recommendations will be sent to the primary and referring physician via written or electronic means when information is available    History of Present Illness:  Alyssa Castellanos is a 39 y.o. female w a history of patella maltracking and pat tendinopathy who presented on 05/28/2025 with L knee pain - new acute injury 5/21/25:  she was playing basketball while wearing her brace and another player ran into her leg. She did not think that her patella dislocated, but felt that it might.  + swelling and pain with weight bearing. Swelling and pain have improved.      R knee is fine.     Past MS[facility] HX:  Specialty Problems    None  Mass on finger removed - Beucler   Had a baby 7/2023 - Alexis  7/2020 L shoulder impingement  [id number] , 2016, 2018- Left Severe patella maltracking and malalignment with a history of patellar subluxation and dislocation episodes (managed with brace and PT with Dr. Tre Wilkins)  10/2011: Right patellar tendonitis and PFS (managed by Dr. Tre Wilkins with HEP, refused PT)  3/2013: Left traumatic dislocation with recommended formal PT and MRI if not improved (Managed by Dr. 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 Studied sports admin    tobacco use (any type) no  Alcohol use: no    Medications: Medications Ordered Prior to Encounter[1]      Allergies:  Allergies[2]     Physical Exam:    Visit Vitals  LMP  (LMP Unknown)   OB Status IUD   Smoking Status Never      General appearance: Well-appearing well-nourished  Psych: Normal mood and affect    Neuro: Normal sensation to light touch throughout the involved extremities  Vascular: No extremity edema or discoloration.  Skin: negative.  Lymphatic: no regional lymphadenopathy present.  Eyes: no conjunctival injection.    BILATERAL  Knee exam:     Inspection:  Effusion: None R, ++ L  Erythema No  Warmth No  Ecchymosis No  Quadriceps atrophy No    Knee ROM:    Flexion (140): Full, pain free R, left loss of 40  Extension (0): Full, pain free    Hip ROM:   Hip flexion (supine) (140) Full, pain free  Hip extension (prone) (15) Full, pain free  Hip abduction (45) Full, pain free  Hip adduction (30-45)Full, pain free  Hip IR at 90 flexion (40) Full, pain free  Hip ER at 90 Flexion(40-50) Full, pain free        Palpation:    TTP Medial joint line No  TTP Lateral joint line No  TTP MCL No  TTP LCL No    TTP Inferior medial patellar facets No R + L  TTP Superior medial patellar facets No R + L  TTP Inferior lateral patellar facets No  TTP Superior lateral patellar facet No    TTP Medial femoral condyle No R + L  TTP Lateral femoral condyle No  TTP Medial tibial plateau No  TTP Lateral tibial plateau No  TTP Tibial tubercle No  TTP Inferior pole patella No  TTP Fibular head No  TTP Hoffa's fat pad No    TTP Distal hamstring tendon No  TTP Pes anserine bursa No  TTP Quad tendon No  TTP Patellar tendon No  TTP Proximal gastrocnemius tendon No  TTP Distal iliotibial band, Gerdy's tubercle No    TTP Hip joint line No    Patellar testing:   quadrants of glide:  normal  Pain w/ patellar compression No  Apprehension Negative  Inhibition Negative    Ligament testing:   Lachman Negative   Anterior drawer Negative   Valgus stress testing performed at 0 and 20 Negative  - feels patella instability L  Varus stress testing performed at 0 and 20 Negative   - feels patella instability L  Posterior drawer Negative       Meniscus tests:   Emmanuel's Negative R, inadequate exam L due to loss of motion      Strength:  Quadriceps pain free, 5/5 R, L 4-  Hamstring pain free, 5/5 R, L 4/5  Hip abduction pain free, 5/5  Hip adduction pain free, 5/5  Hip flexion seated pain free, 5/5  Hip flexion supine pain free, 5/5  Hip extension pain free, 5/5    Flexibility:   Popliteal angle L def  Popliteal angle R 10  Heel to butt: 10 cm R,        Functional:  unable    Gait unable to wt bear with out crutches      Imaging:  CT 5/22/25 Acute cortical chip fracture of the anteromedial femoral condyle.  Several intra-articular fragments noted.      Large joint effusion. Small Baker's cyst with possible rupture.      Degenerative changes    L knee xray 5/22/25 Intra-articular cortical chip or avulsion fractures of uncertain  donor site. Further evaluation with CT is recommended.      Large knee joint effusion.        Imaging was personally interpreted and reviewed with the patient and/or family    Patient was prescribed a t scope for patella dislocation L [orthosis] for [diagnosis].The patient is ambulatory with or without aid; but, has weakness, instability and/or deformity of their [right / left] [extremity] which requires stabilization from this orthosis to improve their function.      Verbal and written instructions for the use, wear schedule, cleaning and application of this item were given.  Patient was instructed that should the brace result in increased pain, decreased sensation, increased swelling, or an overall worsening of their medical condition, to please contact our office immediately.      Orthotic management and training was provided for skin care, modifications due to healing tissues, edema changes, interruption in skin integrity, and safety precautions with the orthosis.    Impression and Plan:  Alyssa Castellanos is a 39 y.o. female  and   who presented on 05/28/2025  with new acute L knee injury that is most consistent with recurrent pat dislocation with new loose fragments in joint.     Objective: Large effusion in left knee, loss of 40 degrees of flexion left knee, TTP left inferior and superior medial patellar facets and medial femoral condyle, positive patellar apprehension, negative Lachman, patellar apprehension with varus and valgus stress testing left, unable to weight-bear    Plan: MR for surgical planning.  Fitted with a T scope 2-day should discontinue use of knee immobilizer.  Continue to ice 3-4 times per day for 20 minutes at a time.  We discussed range of motion exercises and quad sets.      ** Please excuse any errors in grammar or translation related to this dictation. Voice recognition software was utilized to prepare this document. **           [1]  Current Outpatient Medications on File Prior to Visit   Medication Sig Dispense Refill   • azelastine (Astelin) 137 mcg (0.1 %) nasal spray Administer 1 spray into each nostril 2 times a day as needed.     • cholecalciferol (Vitamin D3) 25 mcg (1,000 units) tablet Take 1 tablet (25 mcg) by mouth once daily.     • elderberry fruit 350 mg capsule Take by mouth.     • EPINEPHrine 0.3 mg/0.3 mL injection syringe 1 INJECTION(S) AS NEEDED INJECTION     • famotidine (Pepcid) 40 mg tablet Take 1 tablet (40 mg) by mouth every 12 hours.     • fluticasone (Flonase) 50 mcg/actuation nasal spray Administer 1 spray into each nostril once daily as needed for rhinitis. Shake gently. Before first use, prime pump. After use, clean tip and replace cap.     • ibuprofen 800 mg tablet Take 1 tablet (800 mg) by mouth 3  times a day for 7 days. 21 tablet 0   • montelukast (Singulair) 10 mg tablet Take 1 tablet (10 mg) by mouth early in the morning..     • predniSONE (Deltasone) 10 mg tablet      • [DISCONTINUED] ibuprofen 800 mg tablet Take 1 tablet (800 mg) by mouth 3 times a day for 7 days. 21 tablet 0   • [DISCONTINUED] TIRZEPATIDE, WEIGHT LOSS, SUBQ Inject 30 mg under the skin 1 (one) time per week.       No current facility-administered medications on file prior to visit.   [2]  Allergies  Allergen Reactions   • Shellfish Containing Products Swelling     Can't remember exactly

## 2025-05-30 ENCOUNTER — HOSPITAL ENCOUNTER (OUTPATIENT)
Dept: RADIOLOGY | Facility: CLINIC | Age: 40
Discharge: HOME | End: 2025-05-30
Payer: COMMERCIAL

## 2025-05-30 DIAGNOSIS — R26.89 UNABLE TO BEAR WEIGHT: ICD-10-CM

## 2025-05-30 DIAGNOSIS — M25.462 EFFUSION OF LEFT KNEE: ICD-10-CM

## 2025-05-30 DIAGNOSIS — M25.662 DECREASED RANGE OF MOTION OF LEFT KNEE: ICD-10-CM

## 2025-05-30 DIAGNOSIS — S83.015A LATERAL DISLOCATION OF LEFT PATELLA, INITIAL ENCOUNTER: ICD-10-CM

## 2025-05-30 DIAGNOSIS — M23.42 LOOSE BODY OF LEFT KNEE: ICD-10-CM

## 2025-05-30 PROCEDURE — 73721 MRI JNT OF LWR EXTRE W/O DYE: CPT | Mod: LT

## 2025-06-03 ENCOUNTER — APPOINTMENT (OUTPATIENT)
Dept: SPORTS MEDICINE | Facility: HOSPITAL | Age: 40
End: 2025-06-03
Payer: COMMERCIAL

## 2025-06-03 DIAGNOSIS — S83.015A LATERAL DISLOCATION OF LEFT PATELLA, INITIAL ENCOUNTER: Primary | ICD-10-CM

## 2025-06-06 ENCOUNTER — EVALUATION (OUTPATIENT)
Dept: PHYSICAL THERAPY | Facility: CLINIC | Age: 40
End: 2025-06-06
Payer: COMMERCIAL

## 2025-06-06 DIAGNOSIS — M25.562 ACUTE PAIN OF LEFT KNEE: Primary | ICD-10-CM

## 2025-06-06 DIAGNOSIS — S83.015A LATERAL DISLOCATION OF LEFT PATELLA, INITIAL ENCOUNTER: ICD-10-CM

## 2025-06-06 PROCEDURE — 97161 PT EVAL LOW COMPLEX 20 MIN: CPT | Mod: GP

## 2025-06-06 PROCEDURE — 97110 THERAPEUTIC EXERCISES: CPT | Mod: GP

## 2025-06-06 ASSESSMENT — ENCOUNTER SYMPTOMS
LOSS OF SENSATION IN FEET: 0
OCCASIONAL FEELINGS OF UNSTEADINESS: 0
DEPRESSION: 0

## 2025-06-06 NOTE — PROGRESS NOTES
Physical Therapy    Physical Therapy Evaluation and Treatment      Patient Name: Alyssa Castellanos  MRN: [id number]  Today's Date: 6/6/2025    Time Calculation  Start Time: 0900  Stop Time: 0938  Time Calculation (min): 38 min    Insurance:  Visit number: 1 out of 30  Authorization information: Auth Pending   Insurance Type: 2025 BENEFIT / AUTH NEEDED  / $65 COPAY / 30 VISITS / $6850 OOP / ANTHEM / [employer]AILITY #[id number]  Certification Period Start Date: 6/6/25  Certification Period End Date: 9/4/25    General:  Reason for visit: lateral dislocation of left patella, initial encounter   Referred by: Dr. Tre Wilkins     Current Problem:   1. Acute pain of left knee  Follow Up In Physical Therapy      2. Lateral dislocation of left patella, initial encounter  PT eval and treat          SUBJECTIVE:  Alyssa Castellanos is a 39 y.o. female referred to PT for lateral dislocation of left patella, initial encounter . Patient presents with chief complaint of L knee pain after patellar dislocation during basketball game. Patient reports history of L patellar dislocation, last event was 10 years ago. Patient recently started to play basketball again and wears knee braces with any activity secondary to history. Patient reports that she felt it move while playing but was able to continue playing the rest of the game. Next day pain worsened and swelling was more present, trouble walking and putting weight on leg. Patient went to ER and got x-ray, crutches and brace. Patient then followed up with ortho who ordered MRI, patient does have loose fragment and fracture of medial femoral condyle but was told to trial PT before needing surgery. Patient has done PT in past with good success. Patient has been using knee immobilizer and just discontinued yesterday, since has been feeling ok but still cautious with movements. Patient continues to workout, only upper body at this date but did trial bike and was only able to perform for 5 minutes until  "pain.     Onset: 25  Imaging:   MR knee left 25  IMPRESSION:  Small nondisplaced fracture at the anterior aspect of the medial  femoral condyle similar to recent CT with a displaced small  osteochondral fragment at the anterior aspect of the intercondylar  notch.      Advanced osteoarthritis of the patellofemoral joint with other  intra-articular loose body at the lateral aspect of the suprapatellar  bursa.      Joint effusion with Baker's cyst with a component of partial rupture.    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)    Hobbies: Working out (cardio, weight lifting)  PLOF: basketball   Home livin year old son  Lives in home- recently   Bedroom on second floor-  recently able to perform reciprocal with single railing     Medical History[1]  Surgical History[2]  Current Medications[3]  Relevant Past Medical History:Reviewed in chart   Surgical History: Reviewed in chart  Medications: Reviewed in chart  Allergies: Reviewed in chart    Precautions: PMHx considerations: history of recurrent L patellar dislocations   Precautions  STEADI Fall Risk Score (The score of 4 or more indicates an increased risk of falling): 1    PT Outcome Measure:     Other Measures  Lower Extremity Funtional Score (LEFS): 46/80    Pain:  Lowest:  0/10  Highest:  3/10  Location:  L medial aspect of knee   Description:  aching   Aggravating Factors:  stairs, sitting too long, walking   Relieving Factors:  rest, ice  Sleep Pattern:  denies pain while sleeping   Previous Interventions:  none        Red Flags: Do you have any of the following? none  Fever/chills, unexplained weight changes, dizziness/fainting, unexplained change in bowel or bladder functions, unexplained malaise or muscle weakness, night pain/sweats, numbness or tingling    Patient/Family Goal: \"regain strength in left knee, minimize any pain\"     OBJECTIVE:    Knee girth:  B: 38'    Gait:  Deviations: Patient demonstrates ambulation with antalgic " gait pattern and poor step length and height.     Functional Mobility: Patient requires UE support to perform sit to stand transfer     Palpation:  Tender to palpation along medial aspect of L knee   Guarded with patellar mobility on L knee compared to R    Range of Motion   KNEE ROM LEFT RIGHT    AROM AROM   Flexion (0-135) 105 120   Extension (0) 0 0     Strength:  HIP MMT LEFT RIGHT   Flexion 5/5 5/5   Extension 4-/5 4-/5   Abduction 4-/5 4/5     KNEE MMT LEFT RIGHT   Flexion 4+/5 5/5   Extension Able to perform quad set  5/5     Treatments:  Therapeutic Exercise: (10 minutes)  Supine L quad set with towel x10  S/L hip ABD x2  Prone hip EXT x2  Bridge x10   Supine heel slide x3    Self Care: (2 minutes)  OP Education: Initial evaluation completed, findings and plan of care discussed with patient. Patient education on purpose of interventions in order to improve LE stability and strength. Patient education on anatomy associated with diagnosis. Patient performed and was instructed in an individualized HEP with handout issued as below.      HEP:  Access Code: [email or web address]  URL: [email or web address]  Date: 06/06/2025  Prepared by: Santa    Exercises  - Supine Bridge  - 1 x daily - 7 x weekly - 2 sets - 10 reps  - Supine Quad Set  - 1 x daily - 7 x weekly - 2 sets - 10 reps  - Sidelying Hip Abduction  - 1 x daily - 7 x weekly - 2 sets - 10 reps  - Prone Hip Extension  - 1 x daily - 7 x weekly - 2 sets - 10 reps    ASSESSMENT:  Alyssa Castellanos is a 39 y.o. female referred to PT for lateral dislocation of left patella, initial encounter . Patient presents with chief complaint of L knee pain after patellar dislocation during basketball game. Patient demonstrates poor gait mechanics, decreased L knee flexion and decreased gross L>R LE strength. Patient would benefit from skilled PT in order to promote improved stability throughout LE chain, restore L knee ROM and promote return to PLOF.     Charges:    Eval Complexity: Low    Therapeutic Exercise: 1 unit    Response to treatment: Pt verbalized good understanding of education provided. Pt demonstrated appropriate performance of HEP with good understanding. Did not exhibit exacerbation of symptoms at end of session.     PLAN:  OP PT PLAN:  Treatment/Interventions: Aquatic Therapy , Blood Flow Restriction Therapy  , Dry Needling  , Education/Instruction , Gait training , Manual Therapy  , Neuromuscular re-education , Self care/home management , Taping techniques , Therapeutic activities , Therapeutic exercise  , and Vaspneumatic device therapy  PT Plan: Skilled PT   PT Frequency: 1-2 times per week  Duration:6 weeks   Certification Period Start Date: 6/6/25  Certification Period End Date: 9/4/25  Visits Approved: 2025 BENEFIT / AUTH NEEDED  / $65 COPAY / 30 VISITS / $6850 OOP / ANTHEM / AVAILITY #[id number]  Rehab Potential: Good  Clinical Presentation: Stable   Plan of Care Agreement: Patient    Goals:     Active       PT Problem       PT Goal 1       Start:  06/06/25    Expected End:  09/04/25       SHORT TERM GOALS:  1) Patient will report decrease in pain from 3/10 to </= 1/10 to improve quality of life.   2) Patient will improve L knee AROM to WFL in order to improve gait mechanics and functional mobility  3) Patient will demonstrate sit to stand x10 without UE to demonstrate improved LE strength.  4) Patient will ambulate with proper gait mechanics to promote pain free community ambulation.           PT Goal 2       Start:  06/06/25    Expected End:  09/04/25       LONG TERM GOALS:  1) Patient will be independent with HEP to promote self management of condition  2) Patient will perform reciprocal stair negotiation to demonstrate improved LE strength.   3) Patient will tolerate dynamic balance interventions in order to promote return to recreational activities   4) Patient will demonstrate L SLR x20 without quad lag to demonstrate improved LE stability.             Insurance Auth Information    Date of Evaluation: 25    Onset Date: 2025    Referring Physician: Lizette Ang     Surgery in the Last 3 months:  no    CPT Codes: Therapeutic Exercise: 57482, Therapeutic Activity: 36077, Neuromuscular Re-Education: 23305, Manual Therapy: 40276, Gait Trainin, and Self-Care/Home Management Trainin, Aquatic Therapy 72746    Diagnosis:   Problem List Items Addressed This Visit           ICD-10-CM    Acute pain of left knee - Primary M25.562    Relevant Orders    Follow Up In Physical Therapy     Other Visit Diagnoses         Codes      Lateral dislocation of left patella, initial encounter     S83.015A               Functional Outcome:  Other Measures  Lower Extremity Funtional Score (LEFS): 46/80    OT / PT Evaluation complexity:  low    Which of the following best describes the primary reason of therapy: Improving, restoring, or adapting functional mobility or skills    Visits Requested: 12    Date Range: 90 days    Select all conditions that apply: None of these apply     Santa Ferrer PT        [1]   Past Medical History:  Diagnosis Date    Gonococcal infection, unspecified 2020    Gonorrhea in female    Hyperlipidemia, unspecified 04/15/2022    Dyslipidemia    Hypermetropia, bilateral 05/15/2019    Hypermetropia of both eyes    Low grade squamous intraepithelial lesion on cytologic smear of cervix (LGSIL) 2018    Pap smear abnormality of cervix with LGSIL    Personal history of other endocrine, nutritional and metabolic disease 2022    History of vitamin D deficiency   [2]   Past Surgical History:  Procedure Laterality Date    TONSILLECTOMY  2014    Tonsillectomy   [3]   Current Outpatient Medications   Medication Sig Dispense Refill    azelastine (Astelin) 137 mcg (0.1 %) nasal spray Administer 1 spray into each nostril 2 times a day as needed.      cholecalciferol (Vitamin D3) 25 mcg (1,000 units) tablet Take 1 tablet  (25 mcg) by mouth once daily.      elderberry fruit 350 mg capsule Take by mouth.      EPINEPHrine 0.3 mg/0.3 mL injection syringe 1 INJECTION(S) AS NEEDED INJECTION      famotidine (Pepcid) 40 mg tablet Take 1 tablet (40 mg) by mouth every 12 hours.      fluticasone (Flonase) 50 mcg/actuation nasal spray Administer 1 spray into each nostril once daily as needed for rhinitis. Shake gently. Before first use, prime pump. After use, clean tip and replace cap.      montelukast (Singulair) 10 mg tablet Take 1 tablet (10 mg) by mouth early in the morning..      predniSONE (Deltasone) 10 mg tablet        No current facility-administered medications for this visit.

## 2025-06-10 NOTE — PROGRESS NOTES
Physical Therapy  Physical Therapy Treatment    Patient Name: Alyssa Castellanos  MRN: [id number]  Today's Date: 6/11/2025    Time Calculation  Start Time: 0700  Stop Time: 0740  Time Calculation (min): 40 min    Insurance:  Visit number:  2 out of 6  Authorization information: APPROVED 6 VISITS, 6/6/25-8/4/25, AUTH #[id number]   Insurance Type: 2025 BENEFIT / AUTH NEEDED / $65 COPAY / 30 VISITS / $6850 OOP / ANTHEM / [employer]AILITY #[id number]   Certification Period Start Date: 6/6/25   Certification Period End Date: 9/4/25     General:  Reason for visit: lateral dislocation of left patella, initial encounter   Referred by: Dr. Tre Wilkins     Current Problem:   1. Acute pain of left knee  Follow Up In Physical Therapy          SUBJECTIVE:   Pt reports that they are doing well and having no pain at time of the appointment. Pt reports that the exercises are fine but not too challenging at this point.      Precautions:   Precautions  STEADI Fall Risk Score (The score of 4 or more indicates an increased risk of falling): 1     Pain:   Pain Assessment  Pain Assessment: 0-10  0-10 (Numeric) Pain Score: 0 - No pain    OBJECTIVE:    Patient required verbal cueing for decreased knee valgus during squats.   No quad extension lag observed in today's visit    Treatments:  Therapeutic Exercise: ( 40  minutes)  Recumbent bike  5'   Incline gastroc stretch 1'   HS stretch 1'   SLR's 2x10   LAQ's 2x10 5#  Heel taps 3 inch step 2x10 1st set UE 2nd set no UE   Step up and hold airex pad 2x10  Step up and hold airex pad laterally 2x10   Bosu ball lunge lateral lunge 2x10   Marching bridge 2x10       HEP: [id number]       ASSESSMENT:   Pt required verbal cueing to maintain knee extended during incline stretch. Verbal cueing emphasized for quad control during [place] movements.    Charges:   Therapeutic Exercise:  3 unit       PLAN:     Continue to strengthen LE's per patient tolerance.

## 2025-06-11 ENCOUNTER — TREATMENT (OUTPATIENT)
Dept: PHYSICAL THERAPY | Facility: CLINIC | Age: 40
End: 2025-06-11
Payer: COMMERCIAL

## 2025-06-11 DIAGNOSIS — M25.562 ACUTE PAIN OF LEFT KNEE: Primary | ICD-10-CM

## 2025-06-11 PROCEDURE — 97110 THERAPEUTIC EXERCISES: CPT | Mod: GP,CQ

## 2025-06-11 ASSESSMENT — PAIN - FUNCTIONAL ASSESSMENT: PAIN_FUNCTIONAL_ASSESSMENT: 0-10

## 2025-06-11 ASSESSMENT — PAIN SCALES - GENERAL: PAINLEVEL_OUTOF10: 0 - NO PAIN

## 2025-06-18 ENCOUNTER — TREATMENT (OUTPATIENT)
Dept: PHYSICAL THERAPY | Facility: CLINIC | Age: 40
End: 2025-06-18
Payer: COMMERCIAL

## 2025-06-18 DIAGNOSIS — M25.562 ACUTE PAIN OF LEFT KNEE: Primary | ICD-10-CM

## 2025-06-18 PROCEDURE — 97110 THERAPEUTIC EXERCISES: CPT | Mod: GP

## 2025-06-18 ASSESSMENT — PAIN - FUNCTIONAL ASSESSMENT: PAIN_FUNCTIONAL_ASSESSMENT: 0-10

## 2025-06-18 ASSESSMENT — PAIN SCALES - GENERAL: PAINLEVEL_OUTOF10: 0 - NO PAIN

## 2025-06-18 NOTE — PROGRESS NOTES
"Physical Therapy  Physical Therapy Treatment    Patient Name: Alyssa Castellanos  MRN: [id number]  Today's Date: 6/18/2025    Time Calculation  Start Time: 0702  Stop Time: 0750  Time Calculation (min): 48 min    Insurance:  Visit number:  3 out of 6  Authorization information: APPROVED 6 VISITS, 6/6/25-8/4/25, AUTH #[id number]   Insurance Type: 2025 BENEFIT / AUTH NEEDED / $65 COPAY / 30 VISITS / $6850 OOP / ANTHEM / AVAILITY #[id number]   Certification Period Start Date: 6/6/25   Certification Period End Date: 9/4/25     General:  Reason for visit: lateral dislocation of left patella, initial encounter   Referred by: Dr. rTe Wilkins     Current Problem:   1. Acute pain of left knee  Follow Up In Physical Therapy          SUBJECTIVE:   Pt reports that she only had pain on Saturday which may have been after doing too much (kids birthday party, walking at mall), she felt it while she was going down stairs. Next day pain improved. Denies pain next day. Felt good after last treatment session.      Precautions:   Precautions  STEADI Fall Risk Score (The score of 4 or more indicates an increased risk of falling): 1     Pain:   Pain Assessment  Pain Assessment: 0-10  0-10 (Numeric) Pain Score: 0 - No pain    OBJECTIVE:    Patient demonstrates L hip hike with fwd heel taps at this date.     Treatments:  Therapeutic Exercise: (48 minutes)  Recumbent bike  5'   4 way sport cord x10 ea.   6\" fwd heel tap x10   12\" step up to SLS 2x15 ea.   Slant board calf stretch 3x30 seconds   BOSU fwd/lateral lunge x15 ea.   DL leg press 90# x20, 110# 2x20     HEP: [id number]       ASSESSMENT:   Patient tolerated interventions without increase in pain. Patient demonstrated compensation with heel taps at this date with cues to correct, unable to maintain. Patient was able to progress LE strengthening interventions. Patient apprehensive with eccentric quad interventions.     Charges:   Therapeutic Exercise:  3 unit       PLAN:     Continue to " strengthen LE's per patient tolerance.

## 2025-06-25 NOTE — PROGRESS NOTES
Physical Therapy  Physical Therapy Treatment    Patient Name: Alyssa Castellanos  MRN: [id number]  Today's Date: 6/26/2025    Time Calculation  Start Time: 0730  Stop Time: 0815  Time Calculation (min): 45 min    Insurance:  Visit number:  4 out of 6  Authorization information: APPROVED 6 VISITS, 6/6/25-8/4/25, AUTH #[id number]   Insurance Type: 2025 BENEFIT / AUTH NEEDED / $65 COPAY / 30 VISITS / $6850 OOP / ANTHEM / AVAILITY #[id number]   Certification Period Start Date: 6/6/25   Certification Period End Date: 9/4/25     General:  Reason for visit: lateral dislocation of left patella, initial encounter   Referred by: Dr. Tre Wilkins     Current Problem:   1. Acute pain of left knee  Follow Up In Physical Therapy          SUBJECTIVE:   Pt reports the last time they had some pain in the knee was last Saturday only at night after doing too much during the day.      Precautions:   Precautions  STEADI Fall Risk Score (The score of 4 or more indicates an increased risk of falling): 1     Pain:   Pain Assessment  Pain Assessment: 0-10  0-10 (Numeric) Pain Score: 0 - No pain    OBJECTIVE:    L hip shift during squat    Treatments:  Therapeutic Exercise: (40 minutes)  Sports bike  5' lvl 3   Squats with RTB at knees and 12KG KB   Slant board 1'   TRX single leg squats 3x5   PB bridge + HS curl x20   3 way lunges on valslide 3x5   [place]ossack squats 3x5 8KG   Rebounder SL airex pad x20   Rebounder tandem stance on half foam x20    HEP: [id number]       ASSESSMENT:   Pt required verbal cueing for increased hip opening during squat with band and to decrease shift to left. Pt required increased hip hinge during SL valside fwd lunge.     Charges:   Therapeutic Exercise:  3 unit       PLAN:   Continue to progress LE strengthening per patient tolerance.

## 2025-06-26 ENCOUNTER — APPOINTMENT (OUTPATIENT)
Dept: PHYSICAL THERAPY | Facility: CLINIC | Age: 40
End: 2025-06-26
Payer: COMMERCIAL

## 2025-06-26 ENCOUNTER — TREATMENT (OUTPATIENT)
Dept: PHYSICAL THERAPY | Facility: CLINIC | Age: 40
End: 2025-06-26
Payer: COMMERCIAL

## 2025-06-26 DIAGNOSIS — M25.562 ACUTE PAIN OF LEFT KNEE: Primary | ICD-10-CM

## 2025-06-26 PROCEDURE — 97110 THERAPEUTIC EXERCISES: CPT | Mod: GP,CQ

## 2025-06-26 ASSESSMENT — PAIN - FUNCTIONAL ASSESSMENT: PAIN_FUNCTIONAL_ASSESSMENT: 0-10

## 2025-06-26 ASSESSMENT — PAIN SCALES - GENERAL: PAINLEVEL_OUTOF10: 0 - NO PAIN

## 2025-07-01 NOTE — PROGRESS NOTES
"Physical Therapy  Physical Therapy Treatment    Patient Name: Alyssa Castellanos  MRN: [id number]  Today's Date: 7/2/2025    Time Calculation  Start Time: 0741  Stop Time: 0824  Time Calculation (min): 43 min    Insurance:  Visit number:  5 out of 6  Authorization information: APPROVED 6 VISITS, 6/6/25-8/4/25, AUTH #[id number]   Insurance Type: 2025 BENEFIT / AUTH NEEDED / $65 COPAY / 30 VISITS / $6850 OOP / ANTHEM / AVAILITY #[id number]   Certification Period Start Date: 6/6/25   Certification Period End Date: 9/4/25     General:  Reason for visit: lateral dislocation of left patella, initial encounter   Referred by: Dr. Tre Wilkins     Current Problem:   1. Acute pain of left knee  Follow Up In Physical Therapy        SUBJECTIVE:   Pt reports that they were sore for about a full day later after last session from the new variety of exercises.      Precautions:   Precautions  STEADI Fall Risk Score (The score of 4 or more indicates an increased risk of falling): 1     Pain:   Pain Assessment  Pain Assessment: 0-10  0-10 (Numeric) Pain Score: 0 - No pain    OBJECTIVE:    Decreased eccentric control of bottom position of squat  Minor knee valgus during split squat    Treatments:  Therapeutic Exercise: (40 minutes)  Sports bike  5' lvl 3   Squats with RTB at knees and 12KG KB 3x15   Heel taps 2x7 no UE support   Sami squats 30\" x 4  Side stepping band at ankles with KB hold 2' 6KG   [place] split squats 3x10     HEP: [id number]       ASSESSMENT:   Verbal cueing for increased eccentric control of squats and SL activities in today's session. Verbal cueing for increase KALYAN during side stepping with band.     Charges:   Therapeutic Exercise:  3 unit       PLAN:   Continue to progress LE strengthening per patient tolerance.   "

## 2025-07-02 ENCOUNTER — TREATMENT (OUTPATIENT)
Dept: PHYSICAL THERAPY | Facility: CLINIC | Age: 40
End: 2025-07-02
Payer: COMMERCIAL

## 2025-07-02 DIAGNOSIS — M25.562 ACUTE PAIN OF LEFT KNEE: Primary | ICD-10-CM

## 2025-07-02 PROCEDURE — 97110 THERAPEUTIC EXERCISES: CPT | Mod: GP,CQ

## 2025-07-02 ASSESSMENT — PAIN - FUNCTIONAL ASSESSMENT: PAIN_FUNCTIONAL_ASSESSMENT: 0-10

## 2025-07-02 ASSESSMENT — PAIN SCALES - GENERAL: PAINLEVEL_OUTOF10: 0 - NO PAIN

## 2025-07-03 ENCOUNTER — APPOINTMENT (OUTPATIENT)
Dept: PHYSICAL THERAPY | Facility: CLINIC | Age: 40
End: 2025-07-03
Payer: COMMERCIAL

## 2025-07-07 ENCOUNTER — APPOINTMENT (OUTPATIENT)
Dept: PHYSICAL THERAPY | Facility: CLINIC | Age: 40
End: 2025-07-07
Payer: COMMERCIAL

## 2025-07-07 DIAGNOSIS — M25.562 ACUTE PAIN OF LEFT KNEE: Primary | ICD-10-CM

## 2025-07-14 ENCOUNTER — TREATMENT (OUTPATIENT)
Dept: PHYSICAL THERAPY | Facility: CLINIC | Age: 40
End: 2025-07-14
Payer: COMMERCIAL

## 2025-07-14 DIAGNOSIS — M25.562 ACUTE PAIN OF LEFT KNEE: Primary | ICD-10-CM

## 2025-07-14 PROCEDURE — 97110 THERAPEUTIC EXERCISES: CPT | Mod: GP

## 2025-07-14 ASSESSMENT — PAIN SCALES - GENERAL: PAINLEVEL_OUTOF10: 0 - NO PAIN

## 2025-07-14 ASSESSMENT — PAIN - FUNCTIONAL ASSESSMENT: PAIN_FUNCTIONAL_ASSESSMENT: 0-10

## 2025-07-14 NOTE — PROGRESS NOTES
Physical Therapy  Physical Therapy Orthopedic Progress Note and Discharge Summary    Patient Name: Alyssa Castellanos  MRN: [id number]  Today's Date: 7/14/2025    Time Calculation  Start Time: 0747  Stop Time: 0825  Time Calculation (min): 38 min    Date of evaluation: 6/6/25  Number of visits attended: 6    Insurance:  Visit number: 6 out of 6  Authorization information: APPROVED 6 VISITS, 6/6/25-8/4/25, AUTH #[id number]   Insurance Type: 2025 BENEFIT / AUTH NEEDED / $65 COPAY / 30 VISITS / $6850 OOP / ANTHEM / AVAILITY #[id number]   Certification Period Start Date: 6/6/25   Certification Period End Date: 9/4/25      General:  Reason for visit: lateral dislocation of left patella, initial encounter   Referred by: Dr. Tre Wilkins     Current Problem:   1. Acute pain of left knee  Follow Up In Physical Therapy           Precautions:  Precautions  STEADI Fall Risk Score (The score of 4 or more indicates an increased risk of falling): 1    Subjective   Patient reports that she is doing well, has been compliant with HEP. Patient states that she has some discoloration on back of L knee that she noticed after using [employer] gym last week, denies pain, denies irritation. Has not tired jumping or running yet but feels like she should be fine.    Current Condition:  better     PAIN  Pain Assessment: 0-10  0-10 (Numeric) Pain Score: 0 - No pain     Objective   B knee AROM flex/ext: 130-0 degrees  Able to perform L SLR without quad lag x20       Outcome Measures: Updated 7/14/2025  Other Measures  Lower Extremity Funtional Score (LEFS): 75/80     Treatments:    Sports bike  5' lvl 3   L SLR x20   Lateral hop with SL hold 2x10  Fwd/lateral DL hop x30 seconds ea.   Skip/hop for 50ft x4   Jog x200 ft      HEP: [id number]     Charges: TE x3 units     Assessment:   Patient has made good progress through POC. Patient has met all short term and long term goals at this date. Patient is independent with HEP and encouraged to continue with updated  HEP. Patient discussion to discharge at this date, patient agreeable to plan.      Goals: Updated 7/14/2025  Resolved       PT Problem       PT Goal 1 (Met)       Start:  06/06/25    Expected End:  09/04/25    Resolved:  07/14/25    SHORT TERM GOALS:  1) Patient will report decrease in pain from 3/10 to </= 1/10 to improve quality of life.   2) Patient will improve L knee AROM to WFL in order to improve gait mechanics and functional mobility  3) Patient will demonstrate sit to stand x10 without UE to demonstrate improved LE strength.  4) Patient will ambulate with proper gait mechanics to promote pain free community ambulation.        Goal Note       1) MET  2) MET  3) MET  4) MET              PT Goal 2 (Met)       Start:  06/06/25    Expected End:  09/04/25    Resolved:  07/14/25    LONG TERM GOALS:  1) Patient will be independent with HEP to promote self management of condition  2) Patient will perform reciprocal stair negotiation to demonstrate improved LE strength.   3) Patient will tolerate dynamic balance interventions in order to promote return to recreational activities   4) Patient will demonstrate L SLR x20 without quad lag to demonstrate improved LE stability.       Goal Note       1) MET  2) MET  3) MET  4) MET                  Plan of Care: Updated 7/14/2025   Discharge

## 2025-08-28 DIAGNOSIS — Z11.3 ROUTINE SCREENING FOR STI (SEXUALLY TRANSMITTED INFECTION): ICD-10-CM

## 2025-08-28 DIAGNOSIS — Z01.419 WELL WOMAN EXAM: ICD-10-CM

## 2025-10-17 ENCOUNTER — APPOINTMENT (OUTPATIENT)
Dept: PRIMARY CARE | Facility: CLINIC | Age: 40
End: 2025-10-17
Payer: COMMERCIAL

## (undated) DEVICE — SUTURE, ETHILON, 4-0, BLK, MONO, PS-2 18

## (undated) DEVICE — GLOVE, SURGICAL, PROTEXIS PI , 7.5, PF, LF

## (undated) DEVICE — GLOVE, SURGICAL, PROTEXIS PI BLUE W/NEUTHERA, 7.5, PF, LF

## (undated) DEVICE — STOCKINETTE, DOUBLE PLY, 4 X 48 IN, STERILE

## (undated) DEVICE — BLADE, BEAVER, MINI, 15, STAINLESS STEEL

## (undated) DEVICE — BLADE, OPHTHALMIC, BEAVER, MINI, SHARP ONE SIDE

## (undated) DEVICE — Device

## (undated) DEVICE — PADDING, WEBRIL, UNDERCAST, STERILE, 3 IN

## (undated) DEVICE — CUFF, TOURNIQUET, 18 X 4, DUAL PORT/SNGL BLADDER, DISP, LF

## (undated) DEVICE — TOURNIQUET, DIGIT, TOURNI-COT, MEDIUM

## (undated) DEVICE — DRESSING, GAUZE, SPONGE, 12 PLY, 4 X 4 IN, PLASTIC POUCH, STRL 10PK

## (undated) DEVICE — SYRINGE, 30 CC, LUER LOCK

## (undated) DEVICE — APPLICATOR, CHLORAPREP, W/ORANGE TINT, 26ML